# Patient Record
Sex: FEMALE | Race: WHITE | NOT HISPANIC OR LATINO | Employment: FULL TIME | URBAN - METROPOLITAN AREA
[De-identification: names, ages, dates, MRNs, and addresses within clinical notes are randomized per-mention and may not be internally consistent; named-entity substitution may affect disease eponyms.]

---

## 2022-04-10 ENCOUNTER — HISTORICAL (OUTPATIENT)
Dept: ADMINISTRATIVE | Facility: HOSPITAL | Age: 37
End: 2022-04-10

## 2022-04-25 VITALS
BODY MASS INDEX: 21.1 KG/M2 | WEIGHT: 131.31 LBS | DIASTOLIC BLOOD PRESSURE: 81 MMHG | OXYGEN SATURATION: 98 % | SYSTOLIC BLOOD PRESSURE: 115 MMHG | HEIGHT: 66 IN

## 2022-11-21 ENCOUNTER — HOSPITAL ENCOUNTER (EMERGENCY)
Facility: HOSPITAL | Age: 37
Discharge: HOME | End: 2022-11-21
Attending: STUDENT IN AN ORGANIZED HEALTH CARE EDUCATION/TRAINING PROGRAM
Payer: COMMERCIAL

## 2022-11-21 VITALS
SYSTOLIC BLOOD PRESSURE: 139 MMHG | OXYGEN SATURATION: 100 % | HEART RATE: 100 BPM | DIASTOLIC BLOOD PRESSURE: 99 MMHG | TEMPERATURE: 99 F | RESPIRATION RATE: 18 BRPM

## 2022-11-21 DIAGNOSIS — R03.0 ELEVATED BLOOD PRESSURE READING: Primary | ICD-10-CM

## 2022-11-21 DIAGNOSIS — R42 DIZZINESS: ICD-10-CM

## 2022-11-21 LAB
ALBUMIN SERPL-MCNC: 4.4 GM/DL (ref 3.5–5)
ALBUMIN/GLOB SERPL: 1.4 RATIO (ref 1.1–2)
ALP SERPL-CCNC: 58 UNIT/L (ref 40–150)
ALT SERPL-CCNC: 26 UNIT/L (ref 0–55)
APPEARANCE UR: CLEAR
AST SERPL-CCNC: 20 UNIT/L (ref 5–34)
B-HCG SERPL QL: NEGATIVE
BACTERIA #/AREA URNS AUTO: NORMAL /HPF
BASOPHILS # BLD AUTO: 0.04 X10(3)/MCL (ref 0–0.2)
BASOPHILS NFR BLD AUTO: 0.5 %
BILIRUB UR QL STRIP.AUTO: NEGATIVE MG/DL
BILIRUBIN DIRECT+TOT PNL SERPL-MCNC: 0.2 MG/DL
BUN SERPL-MCNC: 9.3 MG/DL (ref 7–18.7)
CALCIUM SERPL-MCNC: 9.6 MG/DL (ref 8.4–10.2)
CHLORIDE SERPL-SCNC: 105 MMOL/L (ref 98–107)
CO2 SERPL-SCNC: 24 MMOL/L (ref 22–29)
COLOR UR AUTO: YELLOW
CREAT SERPL-MCNC: 0.74 MG/DL (ref 0.55–1.02)
EOSINOPHIL # BLD AUTO: 0.1 X10(3)/MCL (ref 0–0.9)
EOSINOPHIL NFR BLD AUTO: 1.2 %
ERYTHROCYTE [DISTWIDTH] IN BLOOD BY AUTOMATED COUNT: 13 % (ref 11.5–17)
GFR SERPLBLD CREATININE-BSD FMLA CKD-EPI: >60 MLS/MIN/1.73/M2
GLOBULIN SER-MCNC: 3.2 GM/DL (ref 2.4–3.5)
GLUCOSE SERPL-MCNC: 114 MG/DL (ref 74–100)
GLUCOSE UR QL STRIP.AUTO: NEGATIVE MG/DL
HCT VFR BLD AUTO: 40 % (ref 37–47)
HGB BLD-MCNC: 13.2 GM/DL (ref 12–16)
IMM GRANULOCYTES # BLD AUTO: 0.02 X10(3)/MCL (ref 0–0.04)
IMM GRANULOCYTES NFR BLD AUTO: 0.2 %
KETONES UR QL STRIP.AUTO: NEGATIVE MG/DL
LEUKOCYTE ESTERASE UR QL STRIP.AUTO: NEGATIVE UNIT/L
LYMPHOCYTES # BLD AUTO: 1.57 X10(3)/MCL (ref 0.6–4.6)
LYMPHOCYTES NFR BLD AUTO: 19 %
MCH RBC QN AUTO: 28.3 PG (ref 27–31)
MCHC RBC AUTO-ENTMCNC: 33 MG/DL (ref 33–36)
MCV RBC AUTO: 85.8 FL (ref 80–94)
MONOCYTES # BLD AUTO: 0.65 X10(3)/MCL (ref 0.1–1.3)
MONOCYTES NFR BLD AUTO: 7.9 %
NEUTROPHILS # BLD AUTO: 5.9 X10(3)/MCL (ref 2.1–9.2)
NEUTROPHILS NFR BLD AUTO: 71.2 %
NITRITE UR QL STRIP.AUTO: NEGATIVE
NRBC BLD AUTO-RTO: 0 %
PH UR STRIP.AUTO: 8 [PH]
PLATELET # BLD AUTO: 268 X10(3)/MCL (ref 130–400)
PMV BLD AUTO: 10.1 FL (ref 7.4–10.4)
POTASSIUM SERPL-SCNC: 4.2 MMOL/L (ref 3.5–5.1)
PROT SERPL-MCNC: 7.6 GM/DL (ref 6.4–8.3)
PROT UR QL STRIP.AUTO: NEGATIVE MG/DL
RBC # BLD AUTO: 4.66 X10(6)/MCL (ref 4.2–5.4)
RBC #/AREA URNS AUTO: <5 /HPF
RBC UR QL AUTO: NEGATIVE UNIT/L
SODIUM SERPL-SCNC: 137 MMOL/L (ref 136–145)
SP GR UR STRIP.AUTO: 1.01 (ref 1–1.03)
SQUAMOUS #/AREA URNS AUTO: <5 /HPF
TROPONIN I SERPL-MCNC: <0.01 NG/ML (ref 0–0.04)
TSH SERPL-ACNC: 1.38 UIU/ML (ref 0.35–4.94)
UROBILINOGEN UR STRIP-ACNC: 0.2 MG/DL
WBC # SPEC AUTO: 8.3 X10(3)/MCL (ref 4.5–11.5)
WBC #/AREA URNS AUTO: <5 /HPF

## 2022-11-21 PROCEDURE — 93010 ELECTROCARDIOGRAM REPORT: CPT | Mod: ,,, | Performed by: STUDENT IN AN ORGANIZED HEALTH CARE EDUCATION/TRAINING PROGRAM

## 2022-11-21 PROCEDURE — 93010 EKG 12-LEAD: ICD-10-PCS | Mod: ,,, | Performed by: STUDENT IN AN ORGANIZED HEALTH CARE EDUCATION/TRAINING PROGRAM

## 2022-11-21 PROCEDURE — 93005 ELECTROCARDIOGRAM TRACING: CPT

## 2022-11-21 PROCEDURE — 84484 ASSAY OF TROPONIN QUANT: CPT | Performed by: PHYSICIAN ASSISTANT

## 2022-11-21 PROCEDURE — 80053 COMPREHEN METABOLIC PANEL: CPT | Performed by: PHYSICIAN ASSISTANT

## 2022-11-21 PROCEDURE — 81001 URINALYSIS AUTO W/SCOPE: CPT | Performed by: PHYSICIAN ASSISTANT

## 2022-11-21 PROCEDURE — 81003 URINALYSIS AUTO W/O SCOPE: CPT | Performed by: PHYSICIAN ASSISTANT

## 2022-11-21 PROCEDURE — 99285 EMERGENCY DEPT VISIT HI MDM: CPT | Mod: 25

## 2022-11-21 PROCEDURE — 81025 URINE PREGNANCY TEST: CPT | Performed by: PHYSICIAN ASSISTANT

## 2022-11-21 PROCEDURE — 84443 ASSAY THYROID STIM HORMONE: CPT | Performed by: PHYSICIAN ASSISTANT

## 2022-11-21 PROCEDURE — 85025 COMPLETE CBC W/AUTO DIFF WBC: CPT | Performed by: PHYSICIAN ASSISTANT

## 2022-11-21 NOTE — FIRST PROVIDER EVALUATION
Medical screening examination initiated.  I have conducted a focused provider triage encounter, findings are as follows:    Chief Complaint   Patient presents with    arm numbness     Pt. C/o bilat arm numbness with elevated bp as high as 140's systolic.. denies medical problems, fever.. reports hx of anxiety reports took an old script of lexapro this morning.. 96     Brief history of present illness:  37 y.o. female presents to the ED with BUE numbness/tingling onset 1330 with elevated BP (143/101). Also dizziness. No history of HTN. Started on anxiety medications (lexapro) this morning. Denies headache or blurred vision. CBG 96    Vitals:    11/21/22 1505   BP: (!) 156/68   Pulse: 110   Resp: 20   Temp: 98.8 °F (37.1 °C)   SpO2: 100%     Pertinent physical exam:  Awake, alert, ambulatory, non-labored respirations    Brief workup plan:  labs, head CT, EKG    Preliminary workup initiated; this workup will be continued and followed by the physician or advanced practice provider that is assigned to the patient when roomed.

## 2022-11-22 NOTE — ED NOTES
Assumed care of pt at this time. Pt resting in stretcher w/equal unlabored respirations.GCS 15, AOX4. No apparent distress. No new complaints at this time. Updated pt on plan of care. Side rails raised, call bell within reach, bed locked and in lowest position. Will continue to monitor.

## 2022-11-22 NOTE — ED PROVIDER NOTES
Encounter Date: 11/21/2022       History     Chief Complaint   Patient presents with    arm numbness     Pt. C/o bilat arm numbness with elevated bp as high as 140's systolic.. denies medical problems, fever.. reports hx of anxiety reports took an old script of lexapro this morning.. 96     Patient is a 37-year-old female  that presents with shortness of breath that has been present today.  Has had same symptoms intermittent for 3 weeks. Associated symptoms bilateral hand numbness and bilateral lip numbness. Surrounding information is mildly elevated blood pressure. Exacerbated by nothing. Relieved by nothing. Patient treatment prior to arrival none. Risk factors include none. Other history pertaining to this complaint nothing.       The history is provided by the patient. No  was used.   Review of patient's allergies indicates:  No Known Allergies  Past Medical History:   Diagnosis Date    Known health problems: none      History reviewed. No pertinent surgical history.  History reviewed. No pertinent family history.  Social History     Tobacco Use    Smoking status: Never    Smokeless tobacco: Never   Substance Use Topics    Alcohol use: Not Currently    Drug use: Not Currently     Review of Systems   Constitutional:  Negative for fever.   Respiratory:  Positive for shortness of breath. Negative for cough.    Cardiovascular:  Negative for chest pain.   Gastrointestinal:  Negative for abdominal pain.   Genitourinary:  Negative for difficulty urinating and dysuria.   Musculoskeletal:  Negative for gait problem.   Skin:  Negative for color change.   Neurological:  Positive for numbness. Negative for dizziness, speech difficulty and headaches.   Psychiatric/Behavioral:  Negative for hallucinations and suicidal ideas.    All other systems reviewed and are negative.    Physical Exam     Initial Vitals [11/21/22 1505]   BP Pulse Resp Temp SpO2   (!) 156/68 110 20 98.8 °F (37.1 °C) 100 %      MAP        --         Physical Exam    Nursing note and vitals reviewed.  Constitutional: She appears well-developed and well-nourished.   HENT:   Head: Normocephalic.   Eyes: EOM are normal.   Neck:   Normal range of motion.  Cardiovascular:  Normal rate, regular rhythm, normal heart sounds and intact distal pulses.           Pulmonary/Chest: Breath sounds normal. No respiratory distress.   Abdominal: Abdomen is soft. Bowel sounds are normal. There is no abdominal tenderness.   Musculoskeletal:         General: Normal range of motion.      Cervical back: Normal range of motion.     Neurological: She is alert and oriented to person, place, and time. She has normal strength. No cranial nerve deficit.   Skin: Skin is warm and dry.   Psychiatric: She has a normal mood and affect. Her behavior is normal. Judgment and thought content normal.       ED Course   Procedures  Labs Reviewed   COMPREHENSIVE METABOLIC PANEL - Abnormal; Notable for the following components:       Result Value    Glucose Level 114 (*)     All other components within normal limits   TSH - Normal   URINALYSIS, REFLEX TO URINE CULTURE - Normal   PREGNANCY TEST, URINE RAPID - Normal   TROPONIN I - Normal   URINALYSIS, MICROSCOPIC - Normal   CBC W/ AUTO DIFFERENTIAL    Narrative:     The following orders were created for panel order CBC auto differential.  Procedure                               Abnormality         Status                     ---------                               -----------         ------                     CBC with Differential[[id number]]                            Final result                 Please view results for these tests on the individual orders.   CBC WITH DIFFERENTIAL     EKG Readings: (Independently Interpreted)   Rhythm: Normal Sinus Rhythm. Heart Rate: 101. Ectopy: No Ectopy. Conduction: Normal. ST Segments: Normal ST Segments. T Waves: Normal. Axis: Normal. Clinical Impression: Normal Sinus Rhythm     Imaging Results               CT Head Without Contrast (Final result)  Result time 11/21/22 16:31:56      Final result by Bethany Lozada MD (11/21/22 16:31:56)                   Impression:      No acute abnormality seen      Electronically signed by: Bethany Lozada  Date:    11/21/2022  Time:    16:31               Narrative:    EXAMINATION:  CT HEAD WITHOUT CONTRAST    CLINICAL HISTORY:  Dizziness, persistent/recurrent, cardiac or vascular cause suspected;    TECHNIQUE:  Multiple axial images were obtained from the base of the brain to the vertex without contrast administration.  Sagittal and coronal reconstructions were performed. .Automatic exposure control  (AEC) is utilized to reduce patient radiation exposure.    COMPARISON:  None    FINDINGS:  There is no intracranial mass or lesion seen.  No hemorrhage is seen.  No infarct is seen.  The ventricles and basilar cisterns appear normal.  Brain parenchyma appears grossly unremarkable.    Posterior fossa appears normal.  The calvarium is intact.  The paranasal sinuses appear grossly unremarkable.                                       Medications - No data to display  Medical Decision Making:   Initial Assessment:   See H&P  Differential Diagnosis:   CVA, neuropathy, anxiety, thyroid disorder, MI  ED Management:  History was obtained.  Physical exam was performed.  Patient had no neurological deficits.  Cranial nerves were normal.  Patient had bilateral symptoms that resolved.  Do not feel like this is a central neurological problem.  We will patient will follow up with the PCP and keep a diary of her blood pressure.                        Clinical Impression:   Final diagnoses:  [R42] Dizziness  [R03.0] Elevated blood pressure reading (Primary)        ED Disposition Condition    Discharge Stable          ED Prescriptions    None       Follow-up Information       Follow up With Specialties Details Why Contact Info    Your Primary Care Provider  Call in 3 days ed follow up               Jimbo Lee, [facility]  11/21/22 1625

## 2022-11-22 NOTE — ED NOTES
Discharge instructions, return precautions, and follow up information provided to pt. Pt verbalizes understanding. All questions answered. Pt is GCS 15, equal unlabored respirations. Pt ambulated to exit with steady gait.

## 2022-11-30 ENCOUNTER — OFFICE VISIT (OUTPATIENT)
Dept: FAMILY MEDICINE | Facility: CLINIC | Age: 37
End: 2022-11-30
Payer: COMMERCIAL

## 2022-11-30 VITALS
OXYGEN SATURATION: 98 % | HEART RATE: 90 BPM | BODY MASS INDEX: 22.85 KG/M2 | TEMPERATURE: 99 F | RESPIRATION RATE: 18 BRPM | DIASTOLIC BLOOD PRESSURE: 82 MMHG | WEIGHT: 142.19 LBS | SYSTOLIC BLOOD PRESSURE: 115 MMHG | HEIGHT: 66 IN

## 2022-11-30 DIAGNOSIS — F41.9 ANXIETY: Primary | ICD-10-CM

## 2022-11-30 DIAGNOSIS — Z83.2 FAMILY HISTORY OF AUTOIMMUNE DISORDER: ICD-10-CM

## 2022-11-30 DIAGNOSIS — Z00.00 ANNUAL PHYSICAL EXAM: ICD-10-CM

## 2022-11-30 DIAGNOSIS — R73.9 HYPERGLYCEMIA: ICD-10-CM

## 2022-11-30 PROCEDURE — 99214 PR OFFICE/OUTPT VISIT, EST, LEVL IV, 30-39 MIN: ICD-10-PCS | Mod: ,,, | Performed by: STUDENT IN AN ORGANIZED HEALTH CARE EDUCATION/TRAINING PROGRAM

## 2022-11-30 PROCEDURE — 99214 OFFICE O/P EST MOD 30 MIN: CPT | Mod: ,,, | Performed by: STUDENT IN AN ORGANIZED HEALTH CARE EDUCATION/TRAINING PROGRAM

## 2022-11-30 RX ORDER — ESCITALOPRAM OXALATE 5 MG/1
5 TABLET ORAL DAILY
Qty: 90 TABLET | Refills: 3 | Status: SHIPPED | OUTPATIENT
Start: 2022-11-30 | End: 2023-12-11

## 2022-11-30 RX ORDER — ESCITALOPRAM OXALATE 5 MG/1
5 TABLET ORAL DAILY
COMMUNITY
End: 2022-11-30 | Stop reason: SDUPTHER

## 2022-12-03 LAB — POCT GLUCOSE: 96 MG/DL (ref 70–110)

## 2022-12-21 LAB
CHOLEST SERPL-MSCNC: 181 MG/DL (ref 0–200)
HDLC SERPL-MCNC: 65 MG/DL (ref 35–70)
LDLC SERPL CALC-MCNC: 101 MG/DL (ref 0–160)
TRIGL SERPL-MCNC: 66 MG/DL (ref 40–160)

## 2023-01-10 ENCOUNTER — TELEPHONE (OUTPATIENT)
Dept: FAMILY MEDICINE | Facility: CLINIC | Age: 38
End: 2023-01-10
Payer: COMMERCIAL

## 2023-01-13 ENCOUNTER — PATIENT MESSAGE (OUTPATIENT)
Dept: FAMILY MEDICINE | Facility: CLINIC | Age: 38
End: 2023-01-13
Payer: COMMERCIAL

## 2023-01-13 ENCOUNTER — DOCUMENTATION ONLY (OUTPATIENT)
Dept: FAMILY MEDICINE | Facility: CLINIC | Age: 38
End: 2023-01-13
Payer: COMMERCIAL

## 2023-01-13 NOTE — TELEPHONE ENCOUNTER
Advised pt that our office has not gotten her results. She had the labs done at [facility]. I called [facility]. The results were faxed and scanned in her chart (lipids, JHONATAN). Can you please review them so that I call pt.

## 2023-01-13 NOTE — TELEPHONE ENCOUNTER
Lab results were obtained from [facility] and reviewed by Dr Ziegler. Called pt with the results

## 2023-01-31 ENCOUNTER — OFFICE VISIT (OUTPATIENT)
Dept: FAMILY MEDICINE | Facility: CLINIC | Age: 38
End: 2023-01-31
Payer: COMMERCIAL

## 2023-01-31 VITALS
DIASTOLIC BLOOD PRESSURE: 80 MMHG | RESPIRATION RATE: 18 BRPM | WEIGHT: 143.81 LBS | OXYGEN SATURATION: 98 % | TEMPERATURE: 98 F | HEIGHT: 66 IN | HEART RATE: 98 BPM | SYSTOLIC BLOOD PRESSURE: 113 MMHG | BODY MASS INDEX: 23.11 KG/M2

## 2023-01-31 DIAGNOSIS — Z00.00 ANNUAL PHYSICAL EXAM: Primary | ICD-10-CM

## 2023-01-31 DIAGNOSIS — F41.9 ANXIETY: ICD-10-CM

## 2023-01-31 PROCEDURE — 99395 PR PREVENTIVE VISIT,EST,18-39: ICD-10-PCS | Mod: ,,, | Performed by: STUDENT IN AN ORGANIZED HEALTH CARE EDUCATION/TRAINING PROGRAM

## 2023-01-31 PROCEDURE — 99395 PREV VISIT EST AGE 18-39: CPT | Mod: ,,, | Performed by: STUDENT IN AN ORGANIZED HEALTH CARE EDUCATION/TRAINING PROGRAM

## 2023-01-31 NOTE — PROGRESS NOTES
"Subjective:      Patient ID: Nevin Liz is a 38 y.o.  female.    Chief Complaint: Wellness    Preventative Health: Patient would like to defer Tdap to a future visit. Patient UTD on labs. Patient following with Dr. Fer Luu with OB-GYN for her well-woman exam.    Anxiety: Mood stable on Lexapro.    Review of Systems   Constitutional:  Negative for activity change, fatigue and fever.   Eyes:  Negative for visual disturbance.   Respiratory:  Negative for apnea, cough and shortness of breath.    Cardiovascular:  Negative for chest pain, palpitations and leg swelling.   Gastrointestinal:  Negative for abdominal pain, diarrhea, nausea and vomiting.   Genitourinary:  Negative for dysuria and hematuria.   Musculoskeletal:  Negative for arthralgias, back pain and myalgias.   Skin:  Negative for rash and wound.   Neurological:  Negative for dizziness, syncope, weakness, light-headedness, numbness and headaches.   Psychiatric/Behavioral:  Negative for behavioral problems, dysphoric mood, hallucinations, self-injury, sleep disturbance and suicidal ideas. The patient is not nervous/anxious.      Objective:   /80 (BP Location: Right arm, Patient Position: Sitting, BP Method: Large (Automatic))   Pulse 98   Temp 98.4 °F (36.9 °C) (Oral)   Resp 18   Ht 5' 6" (1.676 m)   Wt 65.2 kg (143 lb 12.8 oz)   LMP 01/30/2023   SpO2 98%   BMI 23.21 kg/m²     Physical Exam  Vitals and nursing note reviewed.   Constitutional:       General: She is not in acute distress.     Appearance: Normal appearance. She is not ill-appearing or toxic-appearing.   HENT:      Head: Normocephalic and atraumatic.      Mouth/Throat:      Mouth: Mucous membranes are moist.      Pharynx: Oropharynx is clear. No oropharyngeal exudate or posterior oropharyngeal erythema.   Eyes:      Conjunctiva/sclera: Conjunctivae normal.   Cardiovascular:      Rate and Rhythm: Normal rate and regular rhythm.      Heart sounds: Normal heart sounds. No " murmur heard.  Pulmonary:      Effort: Pulmonary effort is normal. No respiratory distress.      Breath sounds: Normal breath sounds.   Abdominal:      General: There is no distension.      Palpations: Abdomen is soft.      Tenderness: There is no abdominal tenderness.   Musculoskeletal:         General: No deformity.      Cervical back: Neck supple. No tenderness.      Right lower leg: No edema.      Left lower leg: No edema.   Lymphadenopathy:      Cervical: No cervical adenopathy.   Skin:     General: Skin is warm and dry.      Findings: No lesion or rash.   Neurological:      General: No focal deficit present.      Mental Status: She is alert. Mental status is at baseline.   Psychiatric:         Mood and Affect: Mood normal.         Behavior: Behavior normal.         Thought Content: Thought content normal.         Judgment: Judgment normal.     Assessment/Plan:   1. Annual physical exam  Comments:  - Health maintenance reviewed with patient.  - Patient UTD on labs.    2. Anxiety  Assessment & Plan:  - Mood stable.  - Continue Lexapro 5mg daily.         Follow up in about 1 year (around 1/31/2024) for Wellness.

## 2023-02-03 ENCOUNTER — PATIENT MESSAGE (OUTPATIENT)
Dept: FAMILY MEDICINE | Facility: CLINIC | Age: 38
End: 2023-02-03
Payer: COMMERCIAL

## 2023-02-06 ENCOUNTER — PATIENT MESSAGE (OUTPATIENT)
Dept: FAMILY MEDICINE | Facility: CLINIC | Age: 38
End: 2023-02-06
Payer: COMMERCIAL

## 2023-02-06 RX ORDER — SCOLOPAMINE TRANSDERMAL SYSTEM 1 MG/1
1 PATCH, EXTENDED RELEASE TRANSDERMAL
Qty: 4 PATCH | Refills: 0 | Status: SHIPPED | OUTPATIENT
Start: 2023-02-06 | End: 2023-09-15

## 2023-03-17 ENCOUNTER — PATIENT MESSAGE (OUTPATIENT)
Dept: RESEARCH | Facility: HOSPITAL | Age: 38
End: 2023-03-17
Payer: COMMERCIAL

## 2023-06-05 ENCOUNTER — PATIENT MESSAGE (OUTPATIENT)
Dept: FAMILY MEDICINE | Facility: CLINIC | Age: 38
End: 2023-06-05
Payer: COMMERCIAL

## 2023-06-05 NOTE — TELEPHONE ENCOUNTER
In regards to pregnancy with Lexapro:     - Lexapro crosses the placenta and is distributed into the amniotic fluid.  - If pregnancy occurs during treatment, Lexapro can be continued.   - You have to weigh risk/benefit during pregnancy, especially in the 3rd trimester. Adverse effects in the  following SSRI exposure late in the 3rd trimester can include: apnea, constant crying, cyanosis, feeding difficulty, hyperreflexia, hypo- or hypertonia, hypoglycemia, irritability, jitteriness, respiratory distress, seizures, temperature instability, tremor, and vomiting.  - Risk of fetal harm low, though risk of  withdrawal symptoms or serotonin syndrome based on human data.  - Risk of  persistent pulmonary hypertension or autism inconclusive.    In summation, there are always potential risks when taking any medications. I want the patient to be fully educated on the potential risks if she chooses to continue her Lexapro. She can also consult with her OB-GYN for other recommendations for her anxiety as well.

## 2023-06-08 ENCOUNTER — TELEPHONE (OUTPATIENT)
Dept: FAMILY MEDICINE | Facility: CLINIC | Age: 38
End: 2023-06-08
Payer: COMMERCIAL

## 2023-06-08 NOTE — TELEPHONE ENCOUNTER
----- Message from Roxanne Bailon sent at 6/7/2023  4:36 PM CDT -----  Regarding: med adv  Type:  Needs Medical Advice    Who Called: Dr. Erica Worrell's office  Additional Information: please refax patient's information, there were missing information. Please fax to [phone]

## 2023-07-26 LAB
ABO AND RH: NORMAL
ABO AND RH: NORMAL
ANTIBODY SCREEN: NORMAL
C TRACH RRNA SPEC QL PROBE: NORMAL
HBV SURFACE AG SERPL QL IA: NEGATIVE
HCV AB SERPL QL IA: NORMAL
HIV 1+2 AB+HIV1 P24 AG SERPL QL IA: NORMAL
N GONORRHOEAE, AMPLIFIED DNA: NORMAL
RPR: NONREACTIVE
RUBV IGG SER QL: NORMAL

## 2023-08-28 DIAGNOSIS — O36.0920 ANTI-E ISOIMMUNIZATION AFFECTING PREGNANCY IN SECOND TRIMESTER, SINGLE OR UNSPECIFIED FETUS: ICD-10-CM

## 2023-08-28 DIAGNOSIS — O09.522 AMA (ADVANCED MATERNAL AGE) MULTIGRAVIDA 35+, SECOND TRIMESTER: Primary | ICD-10-CM

## 2023-09-14 ENCOUNTER — OFFICE VISIT (OUTPATIENT)
Dept: MATERNAL FETAL MEDICINE | Facility: CLINIC | Age: 38
End: 2023-09-14
Payer: COMMERCIAL

## 2023-09-14 ENCOUNTER — PROCEDURE VISIT (OUTPATIENT)
Dept: MATERNAL FETAL MEDICINE | Facility: CLINIC | Age: 38
End: 2023-09-14
Payer: COMMERCIAL

## 2023-09-14 VITALS
RESPIRATION RATE: 18 BRPM | DIASTOLIC BLOOD PRESSURE: 83 MMHG | SYSTOLIC BLOOD PRESSURE: 139 MMHG | BODY MASS INDEX: 25.86 KG/M2 | HEART RATE: 113 BPM | HEIGHT: 66 IN | WEIGHT: 160.94 LBS

## 2023-09-14 DIAGNOSIS — O99.342 MENTAL DISORDER AFFECTING PREGNANCY IN SECOND TRIMESTER: ICD-10-CM

## 2023-09-14 DIAGNOSIS — O34.42 HISTORY OF CERVICAL LEEP BIOPSY AFFECTING CARE OF MOTHER, ANTEPARTUM, SECOND TRIMESTER: ICD-10-CM

## 2023-09-14 DIAGNOSIS — O34.12 UTERINE FIBROIDS AFFECTING PREGNANCY IN SECOND TRIMESTER: ICD-10-CM

## 2023-09-14 DIAGNOSIS — O09.522 ELDERLY MULTIGRAVIDA IN SECOND TRIMESTER: ICD-10-CM

## 2023-09-14 DIAGNOSIS — O09.522 AMA (ADVANCED MATERNAL AGE) MULTIGRAVIDA 35+, SECOND TRIMESTER: ICD-10-CM

## 2023-09-14 DIAGNOSIS — O34.592 ANOMALY OF PREGNANT UTERUS, SECOND TRIMESTER: ICD-10-CM

## 2023-09-14 DIAGNOSIS — O36.0920 ANTI-E ISOIMMUNIZATION AFFECTING PREGNANCY IN SECOND TRIMESTER, SINGLE OR UNSPECIFIED FETUS: ICD-10-CM

## 2023-09-14 DIAGNOSIS — D25.9 UTERINE FIBROIDS AFFECTING PREGNANCY IN SECOND TRIMESTER: ICD-10-CM

## 2023-09-14 PROCEDURE — 76811 OB US DETAILED SNGL FETUS: CPT | Mod: S$GLB,,, | Performed by: OBSTETRICS & GYNECOLOGY

## 2023-09-14 PROCEDURE — 76811 PR US, OB FETAL EVAL & EXAM, TRANSABDOM,FIRST GESTATION: ICD-10-PCS | Mod: S$GLB,,, | Performed by: OBSTETRICS & GYNECOLOGY

## 2023-09-14 PROCEDURE — 99204 OFFICE O/P NEW MOD 45 MIN: CPT | Mod: 25,S$GLB,, | Performed by: OBSTETRICS & GYNECOLOGY

## 2023-09-14 PROCEDURE — 99204 PR OFFICE/OUTPT VISIT, NEW, LEVL IV, 45-59 MIN: ICD-10-PCS | Mod: 25,S$GLB,, | Performed by: OBSTETRICS & GYNECOLOGY

## 2023-09-14 NOTE — ASSESSMENT & PLAN NOTE
There are > 50 different red cell antigens that have been associated with hemolytic disease of the fetus and  (HDFN), but the antibodies frequently associated with severe HDFN include those against RhD, Rhc, and [place] (K1). 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   We discussed the pathophysiology of antibody sensitization as well as the risk of fetal anemia and hydrops. HDFN does not usually occur until a critical titer is reached (1:16 at [facility]).     23- too weak to titer    Recommendations:   Verification of the father of the babys blood group and antigen phenotype. This will reveal one of the following scenarios:  o If he is does not carry the E antigen (phenotype negative), there is nothing further to do as this fetus will not have inherited the E antigen and will not be at risk for hemolytic disease.      o If he carries the antigen (phenotype positive), may opt to determine genotype/zygosity:  - If he is heterozygous, the fetus has a 50% chance of carrying the antigen. Amniocentesis may offered to verify fetal blood cell antigen genotyping, which is associated with a small risk of miscarriage.    - If he is homozygous for E, then the fetus carries the antigen and management is discussed below.       If the FOB is unknown, declines testing, FOB is homozygous for the E antigen, or if the patient declines amniocentesis, recommend repeat titer at 4-week intervals, using the same lab as the initial titers. If the patients antibody titer rises to a critical value of 1:16, there are two main options:    Without a critical titer, delivery at 39 weeks may be undertaken with no PNT indicated.   Consider maternal blood cross-matching at admission for delivery (if rare antibody and increased risk of hemorrhage) due to potential delay in finding matched units.     Please note that this management scheme assumes that the patients partner is indeed the father of the baby.

## 2023-09-14 NOTE — ASSESSMENT & PLAN NOTE
Today I counseled the patient on the relationship between maternal age and fetal aneuploidy.  We discussed the risks and benefits of screening tests versus definitive genetic testing (amniocentesis). She was counseled about her specific age-related risk of fetal aneuploidy. We discussed the limitations of ultrasound in the definitive diagnosis of fetal aneuploidy.  I quoted the patient a 1 in 900 procedure related risk of fetal loss with genetic amniocentesis.  We also discussed cell free fetal DNA screening including the sensitivity and specificity of the test.  Her questions were answered and after today's consultation she did not want to pursue amniocentesis.  She completed NIPT- 46,XY  Additionally, we discussed the association between advanced maternal age (AMA) and preeclampsia, gestational diabetes, and fetal growth restriction.    Recommendations:   Detailed anatomic survey at 19-20 weeks (started today, some views limited)     Follow-up fetal ultrasound at 32-34 weeks for interval fetal growth   Consider low dose aspirin at 12-16 weeks for preeclampsia risk reduction

## 2023-09-14 NOTE — ASSESSMENT & PLAN NOTE
I counseled the patient regarding Mullerian anomalies in pregnancy. Patient was counseled regarding the risk of increased miscarriage rates, second-trimester loss, low birth weight/FGR, malpresentation, and bleeding. Evidence based guidelines for management and prevention of the above are limited.     Septate uterus    Recommendations:   In women without a history of  birth:  o Transvaginal cervical length measurement at the time of the anatomic survey (previously completed at Dr Walsh's office and normal; abdominal assessment of cervical length normal)   Fetal growth ultrasound at 32 weeks gestation

## 2023-09-14 NOTE — ASSESSMENT & PLAN NOTE
LEEP of the cervix has been associated with premature labor and  PROM. Most such deliveries are late  births (34-37 weeks), and many studies show no significant difference in the frequency of infants with birth weight <2500g.  Adverse outcomes may be associated with the  time interval from procedure to conception.    Cervical length measurement will be performed at the time of anatomy scan. Prophylactic cerclage placement solely for history of LEEP, has not been shown to provide benefit, and it is not recommended.    TVU CL completed at primary OB's office. Abdominal assessment of cervix normal today. TVU was deferred as she already has had TVU at other office and we are able to see it well abdominally.  Notably, she had a full term delivery after LEEP.     Recommendations:   Baseline transvaginal ultrasound cervical length (TVU CL) at anatomy scan (19-20 weeks' gestation)  o If TVU CL ?30 mm, then routine prenatal care.  o If TVU CL 25-29 mm, then:  - Repeat TVU CL in 7-14 days, if unchanged then routine prenatal care. If <25 mm, then see bullets directly below.  o If TVU CL <25 mm, then:  - Consideration for micronized progesterone 200mg nightly as a vaginal suppository until 37w0d.  - Repeat TVU CL every 1-2 weeks until 22w6d for possible further shortening

## 2023-09-14 NOTE — PROGRESS NOTES
MATERNAL-FETAL MEDICINE   CONSULT NOTE    Provider requesting consultation: Dr Walsh    SUBJECTIVE:     Ms. Nevin Liz is a 38 y.o.  female with IUP at 19w3d who is seen in consultation by MFM for evaluation and management of:  Problem   1. 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 AMA in second trimester   3. Anomaly of pregnant uterus, second trimester   4. History of cervical LEEP biopsy affecting care of mother, antepartum, second trimester   5. Uterine fibroids affecting pregnancy in second trimester   6. Anxiety affecting pregnancy in second trimester     Nevin presents for consultation due to anti-E.  On , it was too weak to titer.  She denies a history of blood transfusions.  She is currently 38 years old and will be 39 at the time of delivery.  She completed a NIPT with low risk results.  She states she was diagnosed with a septate uterus in her previous pregnancy.  With her current pregnancy, she was told the pregnancy is in the right side of her uterus due to the location of the septum.  She had a LEEP in .  She completed a transvaginal cervical length screening with her primary OB which was normal per her report.  She had a subsequent full-term delivery with a normal birth weight after this procedure.  She has a history of multiple uterine fibroids.  She denies any pain or discomfort associated with these fibroids.  However, she states she is aware of their location because she is able to palpate the fibroids on her abdomen.  She has a history of anxiety and is taking Lexapro 5 mg daily.  She states this medication works well to keep her symptoms well controlled.       Medication List with Changes/Refills   Current Medications    ESCITALOPRAM OXALATE (LEXAPRO) 5 MG TAB    Take 1 tablet (5 mg total) by mouth once daily.    SCOPOLAMINE (TRANSDERM-SCOP) 1.3-1.5 MG (1 MG OVER 3 DAYS)    Place 1 patch onto the skin every 72 hours.       Review of patient's  "allergies indicates:   Allergen Reactions    Gluten Hives    Penicillins      Rash on chest        PMH:  Past Medical History:   Diagnosis Date    Anxiety     Uterine fibroid        PObHx:  OB History    Para Term  AB Living   2 1 1     1   SAB IAB Ectopic Multiple Live Births           1      # Outcome Date GA Lbr Monico/2nd Weight Sex Delivery Anes PTL Lv   2 Current            1 Term 14 39w0d  3.033 kg (6 lb 11 oz) F CS-LTranv   MARY JO      Birth Comments: breech       PSH:  Past Surgical History:   Procedure Laterality Date     SECTION      CYSTOSCOPY      HERNIA REPAIR      LOOP ELECTROSURGICAL EXCISION PROCEDURE (LEEP)      WISDOM TOOTH EXTRACTION         Family history:family history is not on file.    Social history: reports that she has never smoked. She has never used smokeless tobacco. She reports that she does not currently use alcohol. She reports that she does not use drugs.    Genetic history: The patient denies any inherited genetic diseases or birth defects in herself or her partner's personal history or family.    Objective:   /83 (BP Location: Left arm, Patient Position: Sitting, BP Method: Medium (Automatic))   Pulse (!) 113   Resp 18   Ht 5' 6" (1.676 m)   Wt 73 kg (160 lb 15 oz)   LMP 2023   BMI 25.98 kg/m²     Ultrasound performed. See viewpoint for full ultrasound report.    A detailed fetal anatomic ultrasound examination was performed for the following high risk indication: AMA.   No fetal structural malformations are identified; however, fetal imaging is incomplete today for 3VTV, spine, lateral ventricles and aortic arch.  A follow-up study will be scheduled to complete the fetal anatomic survey.   Fetal size today is consistent with established gestational age.   Cervical length by TA scanning is normal.   Placental location is anterior without evidence of previa. The placental cord insertion is marginal.   Multiple fibroids are present in the " lower uterine segment and anterior body/fundus of the uterus as measured above.     ASSESSMENT/PLAN:     38 y.o.  female with IUP at 19w3d     1. 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   We discussed the pathophysiology of antibody sensitization as well as the risk of fetal anemia and hydrops. HDFN does not usually occur until a critical titer is reached (1:16 at [facility]).     23- too weak to titer    Recommendations:  Verification of the father of the babys blood group and antigen phenotype. This will reveal one of the following scenarios:  If he is does not carry the E antigen (phenotype negative), there is nothing further to do as this fetus will not have inherited the E antigen and will not be at risk for hemolytic disease.      If he carries the antigen (phenotype positive), may opt to determine genotype/zygosity:  If he is heterozygous, the fetus has a 50% chance of carrying the antigen. Amniocentesis may offered to verify fetal blood cell antigen genotyping, which is associated with a small risk of miscarriage.    If he is homozygous for E, then the fetus carries the antigen and management is discussed below.      If the FOB is unknown, declines testing, FOB is homozygous for the E antigen, or if the patient declines amniocentesis, recommend repeat titer at 4-week intervals, using the same lab as the initial titers. If the patients antibody titer rises to a critical value of 1:16, there are two main options:   Without a critical titer, delivery at 39 weeks may be undertaken with no PNT indicated.  Consider maternal blood cross-matching at admission for delivery (if rare antibody and increased risk of  hemorrhage) due to potential delay in finding matched units.     Please note that this management scheme assumes that the patients partner is indeed the father of the baby.          2. AMA in second trimester  Today I counseled the patient on the relationship between maternal age and fetal aneuploidy.  We discussed the risks and benefits of screening tests versus definitive genetic testing (amniocentesis). She was counseled about her specific age-related risk of fetal aneuploidy. We discussed the limitations of ultrasound in the definitive diagnosis of fetal aneuploidy.  I quoted the patient a 1 in 900 procedure related risk of fetal loss with genetic amniocentesis.  We also discussed cell free fetal DNA screening including the sensitivity and specificity of the test.  Her questions were answered and after today's consultation she did not want to pursue amniocentesis.  She completed NIPT- 46,XY  Additionally, we discussed the association between advanced maternal age (AMA) and preeclampsia, gestational diabetes, and fetal growth restriction.    Recommendations:  Detailed anatomic survey at 19-20 weeks (started today, some views limited)    Follow-up fetal ultrasound at 32-34 weeks for interval fetal growth  Consider low dose aspirin at 12-16 weeks for preeclampsia risk reduction        4. History of cervical LEEP biopsy affecting care of mother, antepartum, second trimester  LEEP of the cervix has been associated with premature labor and  PROM. Most such deliveries are late  births (34-37 weeks), and many studies show no significant difference in the frequency of infants with birth weight <2500g.  Adverse outcomes may be associated with the  time interval from procedure to conception.    Cervical length measurement will be performed at the time of anatomy scan. Prophylactic cerclage placement solely for history of LEEP, has not been shown to provide benefit, and it is not recommended.    TVU CL  completed at primary OB's office. Abdominal assessment of cervix normal today. TVU was deferred as she already has had TVU at other office and we are able to see it well abdominally.  Notably, she had a full term delivery after LEEP.     Recommendations:  Baseline transvaginal ultrasound cervical length (TVU CL) at anatomy scan (19-20 weeks' gestation)  If TVU CL ?30 mm, then routine prenatal care.  If TVU CL 25-29 mm, then:  Repeat TVU CL in 7-14 days, if unchanged then routine prenatal care. If <25 mm, then see bullets directly below.  If TVU CL <25 mm, then:  Consideration for micronized progesterone 200mg nightly as a vaginal suppository until 37w0d.  Repeat TVU CL every 1-2 weeks until 22w6d for possible further shortening        3. Anomaly of pregnant uterus, second trimester  I counseled the patient regarding Mullerian anomalies in pregnancy. Patient was counseled regarding the risk of increased miscarriage rates, second-trimester loss, low birth weight/FGR, malpresentation, and bleeding. Evidence based guidelines for management and prevention of the above are limited.     Septate uterus    Recommendations:  In women without a history of  birth:  Transvaginal cervical length measurement at the time of the anatomic survey (previously completed at Dr Walsh's office and normal; abdominal assessment of cervical length normal)  Fetal growth ultrasound at 32 weeks gestation      5. Uterine fibroids affecting pregnancy in second trimester  I counseled the patient regarding fibroids in pregnancy. She was counseled on the risk for  delivery,  PROM, fibroid degeneration, poor fetal growth, oligohydramnios, vaginal bleeding, and in rare circumstances obstruction of lower uterus/cervix, impeding vaginal delivery.    23- HERNAN intramural fibroid 4.88 x 3.8 x 4.26cm; multiple anterior intramural fibroids with largest measuring 6.01 x 4.37 x 2.36cm    Recommendations:  Fetal growth ultrasound at  32 weeks for patients with a single large fibroid or multiple fibroids  More frequent ultrasounds can be performed per clinical concern  If concern for degenerating fibroids, can administer a short course of NSAIDs (approximately 48-72 hours of Indocin or ibuprofen) prior to 32 weeks gestation  Document postpartum hemorrhage risk on admission to hospital, ensure T&S sent, and consider type and crossmatch depending on postpartum hemorrhage risk      6. Anxiety affecting pregnancy in second trimester  She reports a history of anxiety and is taking Lexapro 5mg daily. She reports improvement of symptoms with the utilization of this medication regimen. Discussed increased risk for peripartum and postpartum mood disorders. Precautions provided.      We discussed the usage of SSRIs in pregnancy and the minimal risks associated with the fetus.      We have discussed the importance of optimization of mental health conditions during pregnancy in an effort to reduce the risk of postpartum mood disorders. We have discussed options for management including psychotherapy, counseling, cognitive behavioral therapy, exercise/yoga, journaling, and psychiatry services. She will notify our office if she is interested in being referred for any of the above.        FOLLOW UP:   F/u in 4 weeks for US/MFM visit    This consultation was completed with the assistance of Jerica Ly NP.      Leonarda Qureshi MD  Maternal Fetal Medicine

## 2023-09-14 NOTE — ASSESSMENT & PLAN NOTE
I counseled the patient regarding fibroids in pregnancy. She was counseled on the risk for  delivery,  PROM, fibroid degeneration, poor fetal growth, oligohydramnios, vaginal bleeding, and in rare circumstances obstruction of lower uterus/cervix, impeding vaginal delivery.    23- HERNAN intramural fibroid 4.88 x 3.8 x 4.26cm; multiple anterior intramural fibroids with largest measuring 6.01 x 4.37 x 2.36cm    Recommendations:   Fetal growth ultrasound at 32 weeks for patients with a single large fibroid or multiple fibroids  o More frequent ultrasounds can be performed per clinical concern   If concern for degenerating fibroids, can administer a short course of NSAIDs (approximately 48-72 hours of Indocin or ibuprofen) prior to 32 weeks gestation   Document postpartum hemorrhage risk on admission to hospital, ensure T&S sent, and consider type and crossmatch depending on postpartum hemorrhage risk

## 2023-09-14 NOTE — ASSESSMENT & PLAN NOTE
She reports a history of anxiety and is taking Lexapro 5mg daily. She reports improvement of symptoms with the utilization of this medication regimen. Discussed increased risk for peripartum and postpartum mood disorders. Precautions provided.      We discussed the usage of SSRIs in pregnancy and the minimal risks associated with the fetus.      We have discussed the importance of optimization of mental health conditions during pregnancy in an effort to reduce the risk of postpartum mood disorders. We have discussed options for management including psychotherapy, counseling, cognitive behavioral therapy, exercise/yoga, journaling, and psychiatry services. She will notify our office if she is interested in being referred for any of the above.

## 2023-10-11 DIAGNOSIS — O36.0920 ANTI-E ISOIMMUNIZATION AFFECTING PREGNANCY IN SECOND TRIMESTER, SINGLE OR UNSPECIFIED FETUS: Primary | ICD-10-CM

## 2023-10-11 DIAGNOSIS — O34.592 ANOMALY OF PREGNANT UTERUS, SECOND TRIMESTER: ICD-10-CM

## 2023-10-11 DIAGNOSIS — O09.522 ELDERLY MULTIGRAVIDA IN SECOND TRIMESTER: ICD-10-CM

## 2023-10-12 ENCOUNTER — PROCEDURE VISIT (OUTPATIENT)
Dept: MATERNAL FETAL MEDICINE | Facility: CLINIC | Age: 38
End: 2023-10-12
Payer: COMMERCIAL

## 2023-10-12 ENCOUNTER — OFFICE VISIT (OUTPATIENT)
Dept: MATERNAL FETAL MEDICINE | Facility: CLINIC | Age: 38
End: 2023-10-12
Payer: COMMERCIAL

## 2023-10-12 VITALS
DIASTOLIC BLOOD PRESSURE: 73 MMHG | SYSTOLIC BLOOD PRESSURE: 128 MMHG | WEIGHT: 169.88 LBS | BODY MASS INDEX: 27.42 KG/M2 | HEART RATE: 121 BPM

## 2023-10-12 DIAGNOSIS — D25.9 UTERINE FIBROIDS AFFECTING PREGNANCY IN SECOND TRIMESTER: ICD-10-CM

## 2023-10-12 DIAGNOSIS — O36.0920 ANTI-E ISOIMMUNIZATION AFFECTING PREGNANCY IN SECOND TRIMESTER, SINGLE OR UNSPECIFIED FETUS: ICD-10-CM

## 2023-10-12 DIAGNOSIS — O34.12 UTERINE FIBROIDS AFFECTING PREGNANCY IN SECOND TRIMESTER: ICD-10-CM

## 2023-10-12 DIAGNOSIS — O35.EXX0 PYELECTASIS OF FETUS ON PRENATAL ULTRASOUND: ICD-10-CM

## 2023-10-12 DIAGNOSIS — O09.522 ELDERLY MULTIGRAVIDA IN SECOND TRIMESTER: ICD-10-CM

## 2023-10-12 DIAGNOSIS — O34.592 ANOMALY OF PREGNANT UTERUS, SECOND TRIMESTER: ICD-10-CM

## 2023-10-12 DIAGNOSIS — O99.342 MENTAL DISORDER AFFECTING PREGNANCY IN SECOND TRIMESTER: ICD-10-CM

## 2023-10-12 PROCEDURE — 76816 OB US FOLLOW-UP PER FETUS: CPT | Mod: S$GLB,,, | Performed by: OBSTETRICS & GYNECOLOGY

## 2023-10-12 PROCEDURE — 99214 OFFICE O/P EST MOD 30 MIN: CPT | Mod: 25,S$GLB,, | Performed by: OBSTETRICS & GYNECOLOGY

## 2023-10-12 PROCEDURE — 76816 PR  US,PREGNANT UTERUS,F/U,TRANSABD APP: ICD-10-PCS | Mod: S$GLB,,, | Performed by: OBSTETRICS & GYNECOLOGY

## 2023-10-12 PROCEDURE — 99214 PR OFFICE/OUTPT VISIT, EST, LEVL IV, 30-39 MIN: ICD-10-PCS | Mod: 25,S$GLB,, | Performed by: OBSTETRICS & GYNECOLOGY

## 2023-10-12 NOTE — ASSESSMENT & PLAN NOTE
There are > 50 different red cell antigens that have been associated with hemolytic disease of the fetus and  (HDFN), but the antibodies frequently associated with severe HDFN include those against RhD, Rhc, and [place] (K1). 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   We discussed the pathophysiology of antibody sensitization as well as the risk of fetal anemia and hydrops. HDFN does not usually occur until a critical titer is reached (1:16 at [facility]).     23- too weak to titer  She states she had labs again about 2 weeks ago that were too low to titer. We will confirm with Emilie LEVINE.     Recommendations:   Verification of the father of the babys blood group and antigen phenotype. This will reveal one of the following scenarios:  o If he is does not carry the E antigen (phenotype negative), there is nothing further to do as this fetus will not have inherited the E antigen and will not be at risk for hemolytic disease.      o If he carries the antigen (phenotype positive), may opt to determine genotype/zygosity:  - If he is heterozygous, the fetus has a 50% chance of carrying the antigen. Amniocentesis may offered to verify fetal blood cell antigen genotyping, which is associated with a small risk of miscarriage.    - If he is homozygous for E, then the fetus carries the antigen and management is discussed below.       If the FOB is unknown, declines testing, FOB is homozygous for the E antigen, or if the patient declines amniocentesis, recommend repeat titer at 4-week intervals, using the same lab as the initial titers. If the patients antibody titer rises to a critical value of 1:16, there are two main options:    Without a critical titer, delivery at 39 weeks may be undertaken with no PNT indicated.   Consider maternal blood cross-matching at admission for delivery (if rare antibody and increased risk of hemorrhage) due to potential delay in finding matched units.     Please note that  this management scheme assumes that the patients partner is indeed the father of the baby.

## 2023-10-12 NOTE — ASSESSMENT & PLAN NOTE
Today I counseled the patient on the relationship between maternal age and fetal aneuploidy.  We discussed the risks and benefits of screening tests versus definitive genetic testing (amniocentesis). She was counseled about her specific age-related risk of fetal aneuploidy. We discussed the limitations of ultrasound in the definitive diagnosis of fetal aneuploidy.  I quoted the patient a 1 in 900 procedure related risk of fetal loss with genetic amniocentesis.  We also discussed cell free fetal DNA screening including the sensitivity and specificity of the test.  Her questions were answered and after today's consultation she did not want to pursue amniocentesis.  She completed NIPT- 46,XY  Additionally, we discussed the association between advanced maternal age (AMA) and preeclampsia, gestational diabetes, and fetal growth restriction.    Recommendations:   Detailed anatomic survey at 19-20 weeks     Follow-up fetal ultrasound at 32-34 weeks for interval fetal growth   Consider low dose aspirin at 12-16 weeks for preeclampsia risk reduction

## 2023-10-12 NOTE — ASSESSMENT & PLAN NOTE
I counseled the patient regarding fibroids in pregnancy. She was counseled on the risk for  delivery,  PROM, fibroid degeneration, poor fetal growth, oligohydramnios, vaginal bleeding, and in rare circumstances obstruction of lower uterus/cervix, impeding vaginal delivery.    23- HERNAN intramural fibroid 4.88 x 3.8 x 4.26cm; multiple anterior intramural fibroids with largest measuring 6.01 x 4.37 x 2.36cm  10/12/23: No significant change in fibroids present.    Recommendations:   Fetal growth ultrasound at 32 weeks for patients with a single large fibroid or multiple fibroids  o More frequent ultrasounds can be performed per clinical concern   If concern for degenerating fibroids, can administer a short course of NSAIDs (approximately 48-72 hours of Indocin or ibuprofen) prior to 32 weeks gestation   Document postpartum hemorrhage risk on admission to hospital, ensure T&S sent, and consider type and crossmatch depending on postpartum hemorrhage risk

## 2023-10-12 NOTE — ASSESSMENT & PLAN NOTE
Mild bilateral renal pelvic dilation measuring 4.9mm (right) and 5mm (left) was noted consistent with UTDA1. The kidneys appear normal as does the bladder. She has a low risk cfDNA and this is a male fetus.     The finding of mild renal pelvis dilation was discussed with the patient. We reviewed basic anatomy of the renal collecting system and then discussed possible etiologies for renal pelvis dilation. When dilation of the renal pelvis is borderline or mild, most cases will resolve spontaneously. However, if the renal dilation persists in the  period, the most common conditions that can cause renal pelvis dilation are UPJ obstruction (ureteropelvic junction), UVJ obstruction (ureterovesical junction) and VUR (vesicoureteral reflux). These conditions predispose infants/children to urinary tract infection, but can be effectively followed and treated. I explained that the vast majority of infants with these findings will have spontaneous resolution during pregnancy. We will plan to re-evaluate the kidneys at her next office visit.

## 2023-10-12 NOTE — PROGRESS NOTES
MATERNAL-FETAL MEDICINE   PROGRESS NOTE      SUBJECTIVE:     Ms. Nevin Liz is a 38 y.o.  female with IUP at 23w3d who is seen in consultation by MFM for evaluation and management of:  Problem   Pyelectasis of Fetus On Prenatal Ultrasound   1. 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 AMA in second trimester   3. Anomaly of pregnant uterus, second trimester   5. Uterine fibroids affecting pregnancy in second trimester   6. Anxiety affecting pregnancy in second trimester      Nevin is doing well overall. She reports significant heartburn and is asking for medication. She is already taking supportive measures. She will try pepcid or prilosec OTC.   She has had her titers redrawn at her OB office and states they have been stable at too low to calculate.    No LOF, VB, ctx. +FM. She feels her largest fibroid near her umbilicus.     Medication List with Changes/Refills   Current Medications    ESCITALOPRAM OXALATE (LEXAPRO) 5 MG TAB    Take 1 tablet (5 mg total) by mouth once daily.         Objective:   /73 (BP Location: Right arm)   Pulse (!) 121   Wt 77 kg (169 lb 13.8 oz)   LMP 2023   BMI 27.42 kg/m²     Ultrasound performed. See viewpoint for full ultrasound report.    A viable cano pregnancy is visualized in breech presentation.  Estimated fetal weight is at the 15th percentile with an abdominal circumference at the 15th percentile.    New mild renal peolvis dilation is noted measuring 4.9mm on the right and 5mm on the left. Anatomic survey remains suboptimal for 3VTV. Amniotic fluid volume is normal.  Placenta is anterior.  Multiple fibroids are present as noted above, with the largest being fundal with mean diameter of 5.5cm. The known uterine septum is noted.     ASSESSMENT/PLAN:     38 y.o.  female with IUP at 23w3d     Pyelectasis of fetus on prenatal ultrasound  Mild bilateral renal pelvic dilation measuring 4.9mm (right) and 5mm (left) was noted  consistent with UTDA1. The kidneys appear normal as does the bladder. She has a low risk cfDNA and this is a male fetus.     The finding of mild renal pelvis dilation was discussed with the patient. We reviewed basic anatomy of the renal collecting system and then discussed possible etiologies for renal pelvis dilation. When dilation of the renal pelvis is borderline or mild, most cases will resolve spontaneously. However, if the renal dilation persists in the  period, the most common conditions that can cause renal pelvis dilation are UPJ obstruction (ureteropelvic junction), UVJ obstruction (ureterovesical junction) and VUR (vesicoureteral reflux). These conditions predispose infants/children to urinary tract infection, but can be effectively followed and treated. I explained that the vast majority of infants with these findings will have spontaneous resolution during pregnancy. We will plan to re-evaluate the kidneys at her next office visit.       5. Uterine fibroids affecting pregnancy in second trimester  I counseled the patient regarding fibroids in pregnancy. She was counseled on the risk for  delivery,  PROM, fibroid degeneration, poor fetal growth, oligohydramnios, vaginal bleeding, and in rare circumstances obstruction of lower uterus/cervix, impeding vaginal delivery.    23- HERNAN intramural fibroid 4.88 x 3.8 x 4.26cm; multiple anterior intramural fibroids with largest measuring 6.01 x 4.37 x 2.36cm  10/12/23: No significant change in fibroids present.    Recommendations:  Fetal growth ultrasound at 32 weeks for patients with a single large fibroid or multiple fibroids  More frequent ultrasounds can be performed per clinical concern  If concern for degenerating fibroids, can administer a short course of NSAIDs (approximately 48-72 hours of Indocin or ibuprofen) prior to 32 weeks gestation  Document postpartum hemorrhage risk on admission to hospital, ensure T&S sent, and  consider type and crossmatch depending on postpartum hemorrhage risk      2. AMA in second trimester  Today I counseled the patient on the relationship between maternal age and fetal aneuploidy.  We discussed the risks and benefits of screening tests versus definitive genetic testing (amniocentesis). She was counseled about her specific age-related risk of fetal aneuploidy. We discussed the limitations of ultrasound in the definitive diagnosis of fetal aneuploidy.  I quoted the patient a 1 in 900 procedure related risk of fetal loss with genetic amniocentesis.  We also discussed cell free fetal DNA screening including the sensitivity and specificity of the test.  Her questions were answered and after today's consultation she did not want to pursue amniocentesis.  She completed NIPT- 46,XY  Additionally, we discussed the association between advanced maternal age (AMA) and preeclampsia, gestational diabetes, and fetal growth restriction.    Recommendations:  Detailed anatomic survey at 19-20 weeks    Follow-up fetal ultrasound at 32-34 weeks for interval fetal growth  Consider low dose aspirin at 12-16 weeks for preeclampsia risk reduction        1. 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   We discussed the pathophysiology of antibody sensitization as well as the risk of fetal anemia and hydrops. HDFN does not usually occur until a critical titer is reached (1:16 at [facility]).     23- too weak to titer  She states she had labs again about 2 weeks ago that were too low to titer. We will confirm with Emilie LEVINE.     Recommendations:  Verification of the father of the babys blood group and antigen phenotype. This will  reveal one of the following scenarios:  If he is does not carry the E antigen (phenotype negative), there is nothing further to do as this fetus will not have inherited the E antigen and will not be at risk for hemolytic disease.      If he carries the antigen (phenotype positive), may opt to determine genotype/zygosity:  If he is heterozygous, the fetus has a 50% chance of carrying the antigen. Amniocentesis may offered to verify fetal blood cell antigen genotyping, which is associated with a small risk of miscarriage.    If he is homozygous for E, then the fetus carries the antigen and management is discussed below.      If the FOB is unknown, declines testing, FOB is homozygous for the E antigen, or if the patient declines amniocentesis, recommend repeat titer at 4-week intervals, using the same lab as the initial titers. If the patients antibody titer rises to a critical value of 1:16, there are two main options:   Without a critical titer, delivery at 39 weeks may be undertaken with no PNT indicated.  Consider maternal blood cross-matching at admission for delivery (if rare antibody and increased risk of hemorrhage) due to potential delay in finding matched units.     Please note that this management scheme assumes that the patients partner is indeed the father of the baby.          6. Anxiety affecting pregnancy in second trimester  She reports a history of anxiety and is taking Lexapro 5mg daily. She reports improvement of symptoms with the utilization of this medication regimen. Discussed increased risk for peripartum and postpartum mood disorders. Precautions provided.      We discussed the usage of SSRIs in pregnancy and the minimal risks associated with the fetus.      We have discussed the importance of optimization of mental health conditions during pregnancy in an effort to reduce the risk of postpartum mood disorders. We have discussed options for management including psychotherapy, counseling,  cognitive behavioral therapy, exercise/yoga, journaling, and psychiatry services. She will notify our office if she is interested in being referred for any of the above.      3. Anomaly of pregnant uterus, second trimester  I counseled the patient regarding Mullerian anomalies in pregnancy. Patient was counseled regarding the risk of increased miscarriage rates, second-trimester loss, low birth weight/FGR, malpresentation, and bleeding. Evidence based guidelines for management and prevention of the above are limited.     Septate uterus    Recommendations:  In women without a history of  birth:  Transvaginal cervical length measurement at the time of the anatomic survey (previously completed at Dr Walsh's office and normal; abdominal assessment of cervical length normal)  Fetal growth ultrasound at 32 weeks gestation        FOLLOW UP:   F/u in 8 weeks for US/MFM visit      Leonarda Qureshi MD  Maternal Fetal Medicine

## 2023-12-04 DIAGNOSIS — O36.0930 ANTI-E ISOIMMUNIZATION AFFECTING PREGNANCY IN THIRD TRIMESTER, SINGLE OR UNSPECIFIED FETUS: ICD-10-CM

## 2023-12-04 DIAGNOSIS — O09.523 AMA (ADVANCED MATERNAL AGE) MULTIGRAVIDA 35+, THIRD TRIMESTER: Primary | ICD-10-CM

## 2023-12-06 PROBLEM — O09.523 ELDERLY MULTIGRAVIDA, THIRD TRIMESTER: Status: ACTIVE | Noted: 2023-09-14

## 2023-12-06 PROBLEM — O34.43: Status: ACTIVE | Noted: 2023-09-14

## 2023-12-06 PROBLEM — O36.0930 ANTI-E ISOIMMUNIZATION AFFECTING PREGNANCY IN THIRD TRIMESTER: Status: ACTIVE | Noted: 2023-09-14

## 2023-12-06 PROBLEM — O34.593: Status: ACTIVE | Noted: 2023-09-14

## 2023-12-06 PROBLEM — O99.343 MENTAL DISORDER AFFECTING PREGNANCY IN THIRD TRIMESTER: Status: ACTIVE | Noted: 2023-09-14

## 2023-12-06 PROBLEM — O34.13 UTERINE FIBROIDS AFFECTING PREGNANCY, THIRD TRIMESTER: Status: ACTIVE | Noted: 2023-09-14

## 2023-12-06 NOTE — ASSESSMENT & PLAN NOTE
There are > 50 different red cell antigens that have been associated with hemolytic disease of the fetus and  (HDFN), but the antibodies frequently associated with severe HDFN include those against RhD, Rhc, and [place] (K1). 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   We discussed the pathophysiology of antibody sensitization as well as the risk of fetal anemia and hydrops. HDFN does not usually occur until a critical titer is reached (1:16 at [facility]).     23- too weak to titer  11/15/23- too weak to titer    Recommendations:  Verification of the father of the babys blood group and antigen phenotype. This will reveal one of the following scenarios:  If he is does not carry the E antigen (phenotype negative), there is nothing further to do as this fetus will not have inherited the E antigen and will not be at risk for hemolytic disease.      If he carries the antigen (phenotype positive), may opt to determine genotype/zygosity:  If he is heterozygous, the fetus has a 50% chance of carrying the antigen. Amniocentesis may offered to verify fetal blood cell antigen genotyping, which is associated with a small risk of miscarriage.    If he is homozygous for E, then the fetus carries the antigen and management is discussed below.      If the FOB is unknown, declines testing, FOB is homozygous for the E antigen, or if the patient declines amniocentesis, recommend repeat titer at 4-week intervals, using the same lab as the initial titers. If the patients antibody titer rises to a critical value of 1:16, there are two main options:   Without a critical titer, delivery at 39 weeks may be undertaken with no PNT indicated.  Consider maternal blood cross-matching at admission for delivery (if rare antibody and increased risk of hemorrhage) due to potential delay in finding matched units.     Please note that this management scheme assumes that the patients partner is indeed the father of the baby.

## 2023-12-06 NOTE — ASSESSMENT & PLAN NOTE
I counseled the patient regarding fibroids in pregnancy. She was counseled on the risk for  delivery,  PROM, fibroid degeneration, poor fetal growth, oligohydramnios, vaginal bleeding, and in rare circumstances obstruction of lower uterus/cervix, impeding vaginal delivery.    23- HERNAN intramural fibroid 4.88 x 3.8 x 4.26cm; multiple anterior intramural fibroids with largest measuring 6.01 x 4.37 x 2.36cm  23- Stable in sizes/appearance.    Recommendations:  Fetal growth ultrasound at 32 weeks for patients with a single large fibroid or multiple fibroids  More frequent ultrasounds can be performed per clinical concern  If concern for degenerating fibroids, can administer a short course of NSAIDs (approximately 48-72 hours of Indocin or ibuprofen) prior to 32 weeks gestation  Document postpartum hemorrhage risk on admission to hospital, ensure T&S sent, and consider type and crossmatch depending on postpartum hemorrhage risk

## 2023-12-06 NOTE — PROGRESS NOTES
Maternal Fetal Medicine follow up consult    SUBJECTIVE:     Nevin Liz is a 38 y.o.  female with IUP at 31w3d who is seen in follow up consultation by MFM.  Pregnancy complications include:   Problem   7. Pyelectasis of fetus on prenatal ultrasound   1. 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 AMA multigravida, third trimester   3. Anomaly of pregnant uterus, third trimester   4. History of loop electrosurgical excision procedure (LEEP) of cervix affecting pregnancy, antepartum, third trimester   5. Uterine fibroids affecting pregnancy, third trimester   6. Anxiety affecting pregnancy in third trimester     Nevin is doing well without complaints. She presents today for a routine follow up appointment.  Denies LOF, VB, contractions. Positive fetal movement.    Previous notes reviewed.   No changes to medical, surgical, family, social, or obstetric history.    Interval history since last M visit: see above    Medications:  Current Outpatient Medications   Medication Instructions    EScitalopram oxalate (LEXAPRO) 5 mg, Oral, Daily       Care team members:  Dr Walsh - Primary OB     OBJECTIVE:   /74   Pulse (!) 126   Wt 80.8 kg (178 lb 3.9 oz)   LMP 2023   BMI 28.77 kg/m²     Ultrasound performed. See viewpoint for full ultrasound report.    A viable cano pregnancy is visualized in breech presentation.  Estimated fetal weight is at the 14th percentile with an abdominal circumference at the 14th percentile. Proximal limb shortening is present without frontal bossing or macrocephaly.  Bilateral renal pelvis dilation remains present, measuring 7.9mm (right) and 7.7mm (left) with slight increase from last visit. Amniotic fluid volume is normal.  Placenta is anterior.  Uterine septum is again noted. Multiple moderately sized fibroids are present with the largest having a mean diameter of 5.8cm (anterior/fundal in location).     ASSESSMENT/PLAN:     38 y.o.   female with IUP at 31w3d    1. Anti-E isoimmunization affecting pregnancy in third trimester  There are > 50 different red cell antigens that have been associated with hemolytic disease of the fetus and  (HDFN), but the antibodies frequently associated with severe HDFN include those against RhD, Rhc, and Jeni (K1). 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   We discussed the pathophysiology of antibody sensitization as well as the risk of fetal anemia and hydrops. HDFN does not usually occur until a critical titer is reached (1:16 at [facility]).     23- too weak to titer  11/15/23- too weak to titer    Recommendations:  Verification of the father of the babys blood group and antigen phenotype. This will reveal one of the following scenarios:  If he is does not carry the E antigen (phenotype negative), there is nothing further to do as this fetus will not have inherited the E antigen and will not be at risk for hemolytic disease.      If he carries the antigen (phenotype positive), may opt to determine genotype/zygosity:  If he is heterozygous, the fetus has a 50% chance of carrying the antigen. Amniocentesis may offered to verify fetal blood cell antigen genotyping, which is associated with a small risk of miscarriage.    If he is homozygous for E, then the fetus carries the antigen and management is discussed below.      If the FOB is unknown, declines testing, FOB is homozygous for the E antigen, or if the patient declines amniocentesis, recommend repeat titer at 4-week intervals, using the same lab as the initial titers. If the patients antibody titer rises to a critical value of 1:16, there are two main options:   Without a critical titer, delivery at 39 weeks may be undertaken with no PNT indicated.  Consider maternal blood cross-matching at admission for delivery (if rare antibody and increased risk of hemorrhage) due to potential delay in finding matched units.     Please note that this  management scheme assumes that the patients partner is indeed the father of the baby.          2. AMA multigravida, third trimester  There is a relationship between maternal age and fetal aneuploidy as well as an association between advanced maternal age (AMA) and preeclampsia, gestational diabetes, and fetal growth restriction.    She completed NIPT- 46,XY    Recommendations:  Detailed anatomic survey at 19-20 weeks (completed)    Follow-up fetal ultrasound at 32-34 weeks for interval fetal growth  Consider low dose aspirin at 12-16 weeks for preeclampsia risk reduction        3. Anomaly of pregnant uterus, third trimester  I counseled the patient regarding Mullerian anomalies in pregnancy. Patient was counseled regarding the risk of increased miscarriage rates, second-trimester loss, low birth weight/FGR, malpresentation, and bleeding. Evidence based guidelines for management and prevention of the above are limited.     Septate uterus    Recommendations:  In women without a history of  birth:  Transvaginal cervical length measurement at the time of the anatomic survey (previously completed at Dr Walsh's office and normal; abdominal assessment of cervical length normal)  Fetal growth ultrasound at 32 weeks gestation      4. History of loop electrosurgical excision procedure (LEEP) of cervix affecting pregnancy, antepartum, third trimester  LEEP of the cervix has been associated with premature labor and  PROM. Most such deliveries are late  births (34-37 weeks), and many studies show no significant difference in the frequency of infants with birth weight <2500g.  Adverse outcomes may be associated with the  time interval from procedure to conception.    Cervical length measurement will be performed at the time of anatomy scan. Prophylactic cerclage placement solely for history of LEEP, has not been shown to provide benefit, and it is not recommended.    TVU CL completed at primary OB's  office. Abdominal assessment of cervix normal. TVU was deferred as she already has had TVU at other office and we are able to see it well abdominally.  Notably, she had a full term delivery after LEEP.     Recommendations:  Baseline transvaginal ultrasound cervical length (TVU CL) at anatomy scan (19-20 weeks' gestation)  If TVU CL ?30 mm, then routine prenatal care.  If TVU CL 25-29 mm, then:  Repeat TVU CL in 7-14 days, if unchanged then routine prenatal care. If <25 mm, then see bullets directly below.  If TVU CL <25 mm, then:  Consideration for micronized progesterone 200mg nightly as a vaginal suppository until 37w0d.  Repeat TVU CL every 1-2 weeks until 22w6d for possible further shortening        5. Uterine fibroids affecting pregnancy, third trimester  I counseled the patient regarding fibroids in pregnancy. She was counseled on the risk for  delivery,  PROM, fibroid degeneration, poor fetal growth, oligohydramnios, vaginal bleeding, and in rare circumstances obstruction of lower uterus/cervix, impeding vaginal delivery.    23- HERNAN intramural fibroid 4.88 x 3.8 x 4.26cm; multiple anterior intramural fibroids with largest measuring 6.01 x 4.37 x 2.36cm  23- Stable in sizes/appearance.    Recommendations:  Fetal growth ultrasound at 32 weeks for patients with a single large fibroid or multiple fibroids  More frequent ultrasounds can be performed per clinical concern  If concern for degenerating fibroids, can administer a short course of NSAIDs (approximately 48-72 hours of Indocin or ibuprofen) prior to 32 weeks gestation  Document postpartum hemorrhage risk on admission to hospital, ensure T&S sent, and consider type and crossmatch depending on postpartum hemorrhage risk      6. Anxiety affecting pregnancy in third trimester  She reports a history of anxiety and is taking Lexapro 5mg daily. She reports improvement of symptoms with the utilization of this medication regimen. Discussed  increased risk for peripartum and postpartum mood disorders. Precautions provided.      We discussed the usage of SSRIs in pregnancy and the minimal risks associated with the fetus.      We have discussed the importance of optimization of mental health conditions during pregnancy in an effort to reduce the risk of postpartum mood disorders. We have discussed options for management including psychotherapy, counseling, cognitive behavioral therapy, exercise/yoga, journaling, and psychiatry services. She will notify our office if she is interested in being referred for any of the above.      7. Pyelectasis of fetus on prenatal ultrasound  10/12/23- Mild bilateral renal pelvic dilation measuring 4.9mm (right) and 5mm (left) was noted consistent with UTDA1. The kidneys appear normal as does the bladder. She has a low risk cfDNA and this is a male fetus.  The finding of mild renal pelvis dilation was discussed with the patient. We reviewed basic anatomy of the renal collecting system and then discussed possible etiologies for renal pelvis dilation. When dilation of the renal pelvis is borderline or mild, most cases will resolve spontaneously. However, if the renal dilation persists in the  period, the most common conditions that can cause renal pelvis dilation are UPJ obstruction (ureteropelvic junction), UVJ obstruction (ureterovesical junction) and VUR (vesicoureteral reflux). These conditions predispose infants/children to urinary tract infection, but can be effectively followed and treated. I explained that the vast majority of infants with these findings will have spontaneous resolution during pregnancy. We will plan to re-evaluate the kidneys at her next office visit.     23- Remains present, measuring 7.9mm (right) and 7.7mm (left) with slight increase from last visit. We discussed possible urology consult and she politely declines. I think this is very reasonable.    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 No frontal bossing or mineralization concerns. No bowing of limbs or chest restriction. She has a long torso and short legs and this is likely constitutional.   She was counseled on the limitations of ultrasound.         F/u in 4 weeks for MFM visit/ultrasound    Leonarda Qureshi MD  Maternal Fetal Medicine

## 2023-12-06 NOTE — ASSESSMENT & PLAN NOTE
I counseled the patient regarding Mullerian anomalies in pregnancy. Patient was counseled regarding the risk of increased miscarriage rates, second-trimester loss, low birth weight/FGR, malpresentation, and bleeding. Evidence based guidelines for management and prevention of the above are limited.     Septate uterus    Recommendations:  In women without a history of  birth:  Transvaginal cervical length measurement at the time of the anatomic survey (previously completed at Dr Walsh's office and normal; abdominal assessment of cervical length normal)  Fetal growth ultrasound at 32 weeks gestation

## 2023-12-06 NOTE — ASSESSMENT & PLAN NOTE
LEEP of the cervix has been associated with premature labor and  PROM. Most such deliveries are late  births (34-37 weeks), and many studies show no significant difference in the frequency of infants with birth weight <2500g.  Adverse outcomes may be associated with the  time interval from procedure to conception.    Cervical length measurement will be performed at the time of anatomy scan. Prophylactic cerclage placement solely for history of LEEP, has not been shown to provide benefit, and it is not recommended.    TVU CL completed at primary OB's office. Abdominal assessment of cervix normal. TVU was deferred as she already has had TVU at other office and we are able to see it well abdominally.  Notably, she had a full term delivery after LEEP.     Recommendations:  Baseline transvaginal ultrasound cervical length (TVU CL) at anatomy scan (19-20 weeks' gestation)  If TVU CL ?30 mm, then routine prenatal care.  If TVU CL 25-29 mm, then:  Repeat TVU CL in 7-14 days, if unchanged then routine prenatal care. If <25 mm, then see bullets directly below.  If TVU CL <25 mm, then:  Consideration for micronized progesterone 200mg nightly as a vaginal suppository until 37w0d.  Repeat TVU CL every 1-2 weeks until 22w6d for possible further shortening

## 2023-12-06 NOTE — ASSESSMENT & PLAN NOTE
10/12/23- Mild bilateral renal pelvic dilation measuring 4.9mm (right) and 5mm (left) was noted consistent with UTDA1. The kidneys appear normal as does the bladder. She has a low risk cfDNA and this is a male fetus.  The finding of mild renal pelvis dilation was discussed with the patient. We reviewed basic anatomy of the renal collecting system and then discussed possible etiologies for renal pelvis dilation. When dilation of the renal pelvis is borderline or mild, most cases will resolve spontaneously. However, if the renal dilation persists in the  period, the most common conditions that can cause renal pelvis dilation are UPJ obstruction (ureteropelvic junction), UVJ obstruction (ureterovesical junction) and VUR (vesicoureteral reflux). These conditions predispose infants/children to urinary tract infection, but can be effectively followed and treated. I explained that the vast majority of infants with these findings will have spontaneous resolution during pregnancy. We will plan to re-evaluate the kidneys at her next office visit.     23- Remains present, measuring 7.9mm (right) and 7.7mm (left) with slight increase from last visit. We discussed possible urology consult and she politely declines. I think this is very reasonable.

## 2023-12-06 NOTE — ASSESSMENT & PLAN NOTE
There is a relationship between maternal age and fetal aneuploidy as well as an association between advanced maternal age (AMA) and preeclampsia, gestational diabetes, and fetal growth restriction.    She completed NIPT- 46,XY    Recommendations:  Detailed anatomic survey at 19-20 weeks (completed)    Follow-up fetal ultrasound at 32-34 weeks for interval fetal growth  Consider low dose aspirin at 12-16 weeks for preeclampsia risk reduction

## 2023-12-07 ENCOUNTER — OFFICE VISIT (OUTPATIENT)
Dept: MATERNAL FETAL MEDICINE | Facility: CLINIC | Age: 38
End: 2023-12-07
Payer: COMMERCIAL

## 2023-12-07 ENCOUNTER — PROCEDURE VISIT (OUTPATIENT)
Dept: MATERNAL FETAL MEDICINE | Facility: CLINIC | Age: 38
End: 2023-12-07
Payer: COMMERCIAL

## 2023-12-07 VITALS
BODY MASS INDEX: 28.77 KG/M2 | WEIGHT: 178.25 LBS | DIASTOLIC BLOOD PRESSURE: 74 MMHG | SYSTOLIC BLOOD PRESSURE: 128 MMHG | HEART RATE: 126 BPM

## 2023-12-07 DIAGNOSIS — O34.593: ICD-10-CM

## 2023-12-07 DIAGNOSIS — O34.43: ICD-10-CM

## 2023-12-07 DIAGNOSIS — O36.0930 ANTI-E ISOIMMUNIZATION AFFECTING PREGNANCY IN THIRD TRIMESTER, SINGLE OR UNSPECIFIED FETUS: Primary | ICD-10-CM

## 2023-12-07 DIAGNOSIS — O34.13 UTERINE FIBROIDS AFFECTING PREGNANCY, THIRD TRIMESTER: ICD-10-CM

## 2023-12-07 DIAGNOSIS — O35.EXX0 PYELECTASIS OF FETUS ON PRENATAL ULTRASOUND: ICD-10-CM

## 2023-12-07 DIAGNOSIS — O36.0930 ANTI-E ISOIMMUNIZATION AFFECTING PREGNANCY IN THIRD TRIMESTER, SINGLE OR UNSPECIFIED FETUS: ICD-10-CM

## 2023-12-07 DIAGNOSIS — O09.523 ELDERLY MULTIGRAVIDA, THIRD TRIMESTER: ICD-10-CM

## 2023-12-07 DIAGNOSIS — O09.523 AMA (ADVANCED MATERNAL AGE) MULTIGRAVIDA 35+, THIRD TRIMESTER: ICD-10-CM

## 2023-12-07 DIAGNOSIS — D25.9 UTERINE FIBROIDS AFFECTING PREGNANCY, THIRD TRIMESTER: ICD-10-CM

## 2023-12-07 DIAGNOSIS — O35.HXX0 SHORT LOWER EXTREMITIES OF FETUS AFFECTING ANTEPARTUM CARE OF MOTHER, SINGLE OR UNSPECIFIED FETUS: ICD-10-CM

## 2023-12-07 DIAGNOSIS — O99.343 MENTAL DISORDER AFFECTING PREGNANCY IN THIRD TRIMESTER: ICD-10-CM

## 2023-12-07 PROCEDURE — 76816 PR  US,PREGNANT UTERUS,F/U,TRANSABD APP: ICD-10-PCS | Mod: S$GLB,,, | Performed by: OBSTETRICS & GYNECOLOGY

## 2023-12-07 PROCEDURE — 99214 PR OFFICE/OUTPT VISIT, EST, LEVL IV, 30-39 MIN: ICD-10-PCS | Mod: S$GLB,,, | Performed by: OBSTETRICS & GYNECOLOGY

## 2023-12-07 PROCEDURE — 76816 OB US FOLLOW-UP PER FETUS: CPT | Mod: S$GLB,,, | Performed by: OBSTETRICS & GYNECOLOGY

## 2023-12-07 PROCEDURE — 99214 OFFICE O/P EST MOD 30 MIN: CPT | Mod: S$GLB,,, | Performed by: OBSTETRICS & GYNECOLOGY

## 2023-12-07 NOTE — ASSESSMENT & PLAN NOTE
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 No frontal bossing or mineralization concerns. No bowing of limbs or chest restriction. She has a long torso and short legs and this is likely constitutional.   She was counseled on the limitations of ultrasound.

## 2023-12-10 DIAGNOSIS — F41.9 ANXIETY: Primary | ICD-10-CM

## 2023-12-11 RX ORDER — ESCITALOPRAM OXALATE 5 MG/1
5 TABLET ORAL
Qty: 90 TABLET | Refills: 3 | Status: SHIPPED | OUTPATIENT
Start: 2023-12-11

## 2023-12-27 DIAGNOSIS — O09.523 ELDERLY MULTIGRAVIDA, THIRD TRIMESTER: ICD-10-CM

## 2023-12-27 DIAGNOSIS — O36.0930 ANTI-E ISOIMMUNIZATION AFFECTING PREGNANCY IN THIRD TRIMESTER, SINGLE OR UNSPECIFIED FETUS: Primary | ICD-10-CM

## 2023-12-27 DIAGNOSIS — O34.593: ICD-10-CM

## 2024-01-04 ENCOUNTER — OFFICE VISIT (OUTPATIENT)
Dept: MATERNAL FETAL MEDICINE | Facility: CLINIC | Age: 39
End: 2024-01-04
Payer: COMMERCIAL

## 2024-01-04 ENCOUNTER — PROCEDURE VISIT (OUTPATIENT)
Dept: MATERNAL FETAL MEDICINE | Facility: CLINIC | Age: 39
End: 2024-01-04
Payer: COMMERCIAL

## 2024-01-04 VITALS
DIASTOLIC BLOOD PRESSURE: 70 MMHG | SYSTOLIC BLOOD PRESSURE: 134 MMHG | HEART RATE: 119 BPM | WEIGHT: 181.75 LBS | BODY MASS INDEX: 29.34 KG/M2

## 2024-01-04 DIAGNOSIS — D25.9 UTERINE FIBROIDS AFFECTING PREGNANCY, THIRD TRIMESTER: ICD-10-CM

## 2024-01-04 DIAGNOSIS — O36.0930 ANTI-E ISOIMMUNIZATION AFFECTING PREGNANCY IN THIRD TRIMESTER, SINGLE OR UNSPECIFIED FETUS: Primary | ICD-10-CM

## 2024-01-04 DIAGNOSIS — O35.HXX0 SHORT LOWER EXTREMITIES OF FETUS AFFECTING ANTEPARTUM CARE OF MOTHER, SINGLE OR UNSPECIFIED FETUS: ICD-10-CM

## 2024-01-04 DIAGNOSIS — O99.343 MENTAL DISORDER AFFECTING PREGNANCY IN THIRD TRIMESTER: ICD-10-CM

## 2024-01-04 DIAGNOSIS — O09.523 ELDERLY MULTIGRAVIDA, THIRD TRIMESTER: ICD-10-CM

## 2024-01-04 DIAGNOSIS — O34.13 UTERINE FIBROIDS AFFECTING PREGNANCY, THIRD TRIMESTER: ICD-10-CM

## 2024-01-04 DIAGNOSIS — O35.EXX0 PYELECTASIS OF FETUS ON PRENATAL ULTRASOUND: ICD-10-CM

## 2024-01-04 DIAGNOSIS — O34.593: ICD-10-CM

## 2024-01-04 DIAGNOSIS — O36.0930 ANTI-E ISOIMMUNIZATION AFFECTING PREGNANCY IN THIRD TRIMESTER, SINGLE OR UNSPECIFIED FETUS: ICD-10-CM

## 2024-01-04 DIAGNOSIS — O34.43: ICD-10-CM

## 2024-01-04 PROCEDURE — 99214 OFFICE O/P EST MOD 30 MIN: CPT | Mod: S$GLB,,, | Performed by: OBSTETRICS & GYNECOLOGY

## 2024-01-04 PROCEDURE — 1160F RVW MEDS BY RX/DR IN RCRD: CPT | Mod: CPTII,S$GLB,, | Performed by: OBSTETRICS & GYNECOLOGY

## 2024-01-04 PROCEDURE — 3008F BODY MASS INDEX DOCD: CPT | Mod: CPTII,S$GLB,, | Performed by: OBSTETRICS & GYNECOLOGY

## 2024-01-04 PROCEDURE — 3078F DIAST BP <80 MM HG: CPT | Mod: CPTII,S$GLB,, | Performed by: OBSTETRICS & GYNECOLOGY

## 2024-01-04 PROCEDURE — 76816 OB US FOLLOW-UP PER FETUS: CPT | Mod: S$GLB,,, | Performed by: OBSTETRICS & GYNECOLOGY

## 2024-01-04 PROCEDURE — 1159F MED LIST DOCD IN RCRD: CPT | Mod: CPTII,S$GLB,, | Performed by: OBSTETRICS & GYNECOLOGY

## 2024-01-04 PROCEDURE — 3075F SYST BP GE 130 - 139MM HG: CPT | Mod: CPTII,S$GLB,, | Performed by: OBSTETRICS & GYNECOLOGY

## 2024-01-04 PROCEDURE — 76819 FETAL BIOPHYS PROFIL W/O NST: CPT | Mod: S$GLB,,, | Performed by: OBSTETRICS & GYNECOLOGY

## 2024-01-04 NOTE — ASSESSMENT & PLAN NOTE
I counseled the patient regarding fibroids in pregnancy. She was counseled on the risk for  delivery,  PROM, fibroid degeneration, poor fetal growth, oligohydramnios, vaginal bleeding, and in rare circumstances obstruction of lower uterus/cervix, impeding vaginal delivery.    23- HERNAN intramural fibroid 4.88 x 3.8 x 4.26cm; multiple anterior intramural fibroids with largest measuring 6.01 x 4.37 x 2.36cm  23- Stable in sizes/appearance.  24- Stable in sizes/appearance.    Recommendations:  Fetal growth ultrasound at 32 weeks for patients with a single large fibroid or multiple fibroids  More frequent ultrasounds can be performed per clinical concern  If concern for degenerating fibroids, can administer a short course of NSAIDs (approximately 48-72 hours of Indocin or ibuprofen) prior to 32 weeks gestation  Document postpartum hemorrhage risk on admission to hospital, ensure T&S sent, and consider type and crossmatch depending on postpartum hemorrhage risk

## 2024-01-04 NOTE — ASSESSMENT & PLAN NOTE
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 No frontal bossing or mineralization concerns. No bowing of limbs or chest restriction. She has a long torso and short legs and this is likely constitutional.   She was counseled on the limitations of ultrasound.     1/4/24- She reports her previous child has short long bones. HL 6.6% (3w lag), FL 2.3% (2w4d lag).

## 2024-01-04 NOTE — ASSESSMENT & PLAN NOTE
There are > 50 different red cell antigens that have been associated with hemolytic disease of the fetus and  (HDFN), but the antibodies frequently associated with severe HDFN include those against RhD, Rhc, and [place] (K1). 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   We discussed the pathophysiology of antibody sensitization as well as the risk of fetal anemia and hydrops. HDFN does not usually occur until a critical titer is reached (1:16 at [facility]).     23- too weak to titer  11/15/23- too weak to titer      Recommendations:  Verification of the father of the babys blood group and antigen phenotype. This will reveal one of the following scenarios:  If he is does not carry the E antigen (phenotype negative), there is nothing further to do as this fetus will not have inherited the E antigen and will not be at risk for hemolytic disease.      If he carries the antigen (phenotype positive), may opt to determine genotype/zygosity:  If he is heterozygous, the fetus has a 50% chance of carrying the antigen. Amniocentesis may offered to verify fetal blood cell antigen genotyping, which is associated with a small risk of miscarriage.    If he is homozygous for E, then the fetus carries the antigen and management is discussed below.      If the FOB is unknown, declines testing, FOB is homozygous for the E antigen, or if the patient declines amniocentesis, recommend repeat titer at 4-week intervals, using the same lab as the initial titers. If the patients antibody titer rises to a critical value of 1:16, there are two main options:   Without a critical titer, delivery at 39 weeks may be undertaken with no PNT indicated.  Consider maternal blood cross-matching at admission for delivery (if rare antibody and increased risk of hemorrhage) due to potential delay in finding matched units.     Please note that this management scheme assumes that the patients partner is indeed the father of the baby.

## 2024-01-04 NOTE — ASSESSMENT & PLAN NOTE
There is a relationship between maternal age and fetal aneuploidy as well as an association between advanced maternal age (AMA) and preeclampsia, gestational diabetes, and fetal growth restriction.    She completed NIPT- 46,XY    Recommendations:  Detailed anatomic survey at 19-20 weeks (completed)    Follow-up fetal ultrasound at 32-34 weeks for interval fetal growth (completed)  Consider low dose aspirin at 12-16 weeks for preeclampsia risk reduction

## 2024-01-04 NOTE — PROGRESS NOTES
Maternal Fetal Medicine follow up consult    SUBJECTIVE:     Nevin Liz is a 38 y.o.  female with IUP at 35w3d who is seen in follow up consultation by MFM.  Pregnancy complications include:   Problem   8. Short lower extremities, fetal, affecting care of mother, antepartum   7. Pyelectasis of fetus on prenatal ultrasound   1. 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 AMA multigravida, third trimester   3. Anomaly of pregnant uterus, third trimester   4. History of loop electrosurgical excision procedure (LEEP) of cervix affecting pregnancy, antepartum, third trimester   5. Uterine fibroids affecting pregnancy, third trimester   6. Anxiety affecting pregnancy in third trimester     Nevin presents for routine follow up appointment.   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 We have discussed relief measures.  Denies LOF, VB, contractions. Positive fetal movement.    Previous notes reviewed.   No changes to medical, surgical, family, social, or obstetric history.    Interval history since last M visit: see above    Medications:  Current Outpatient Medications   Medication Instructions    EScitalopram oxalate (LEXAPRO) 5 mg, Oral       Care team members:  Dr Walsh - Primary OB     OBJECTIVE:   /70   Pulse (!) 119   Wt 82.5 kg (181 lb 12.3 oz)   LMP 2023   BMI 29.34 kg/m²     Ultrasound performed. See viewpoint for full ultrasound report.    A viable cano pregnancy is visualized in breech presentation.  Estimated fetal weight is at the 26th percentile with an abdominal circumference at the 56th percentile. Proximal limb shortening is present without frontal bossing or macrocephaly.  Bilateral renal pelvis dilation remains present, measuring 9.7mm (left) and 8.4mm (right) with slight increase from last visit. Amniotic fluid volume is normal.  Placenta is anterior with marginal cord insertion.  Uterine septum is again noted.  Multiple moderately sized fibroids are present with the largest having a mean diameter of 4.4cm (anterior/fundal in location).     ASSESSMENT/PLAN:     38 y.o.  female with IUP at 35w3d    1. 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   We discussed the pathophysiology of antibody sensitization as well as the risk of fetal anemia and hydrops. HDFN does not usually occur until a critical titer is reached (1:16 at [facility]).     23- too weak to titer  11/15/23- too weak to titer      Recommendations:  Verification of the father of the babys blood group and antigen phenotype. This will reveal one of the following scenarios:  If he is does not carry the E antigen (phenotype negative), there is nothing further to do as this fetus will not have inherited the E antigen and will not be at risk for hemolytic disease.      If he carries the antigen (phenotype positive), may opt to determine genotype/zygosity:  If he is heterozygous, the fetus has a 50% chance of carrying the antigen. Amniocentesis may offered to verify fetal blood cell antigen genotyping, which is associated with a small risk of miscarriage.    If he is homozygous for E, then the fetus carries the antigen and management is discussed below.      If the FOB is unknown, declines testing, FOB is homozygous for the E antigen, or if the patient declines amniocentesis, recommend repeat titer at 4-week intervals, using the same lab as the initial titers. If the patients antibody titer rises to a critical value of 1:16, there are two main options:   Without a critical titer, delivery at 39 weeks may be undertaken with no PNT indicated.  Consider maternal blood  cross-matching at admission for delivery (if rare antibody and increased risk of hemorrhage) due to potential delay in finding matched units.     Please note that this management scheme assumes that the patients partner is indeed the father of the baby.          2. AMA multigravida, third trimester  There is a relationship between maternal age and fetal aneuploidy as well as an association between advanced maternal age (AMA) and preeclampsia, gestational diabetes, and fetal growth restriction.    She completed NIPT- 46,XY    Recommendations:  Detailed anatomic survey at 19-20 weeks (completed)    Follow-up fetal ultrasound at 32-34 weeks for interval fetal growth (completed)  Consider low dose aspirin at 12-16 weeks for preeclampsia risk reduction        3. Anomaly of pregnant uterus, third trimester  I counseled the patient regarding Mullerian anomalies in pregnancy. Patient was counseled regarding the risk of increased miscarriage rates, second-trimester loss, low birth weight/FGR, malpresentation, and bleeding. Evidence based guidelines for management and prevention of the above are limited.     Septate uterus    Recommendations:  In women without a history of  birth:  Transvaginal cervical length measurement at the time of the anatomic survey (previously completed at Dr Walsh's office and normal; abdominal assessment of cervical length normal)  Fetal growth ultrasound at 32 weeks gestation      4. History of loop electrosurgical excision procedure (LEEP) of cervix affecting pregnancy, antepartum, third trimester  LEEP of the cervix has been associated with premature labor and  PROM. Most such deliveries are late  births (34-37 weeks), and many studies show no significant difference in the frequency of infants with birth weight <2500g.  Adverse outcomes may be associated with the  time interval from procedure to conception.    Cervical length measurement will be performed at the time of  anatomy scan. Prophylactic cerclage placement solely for history of LEEP, has not been shown to provide benefit, and it is not recommended.    TVU CL completed at primary OB's office. Abdominal assessment of cervix normal. TVU was deferred as she already has had TVU at other office and we are able to see it well abdominally.  Notably, she had a full term delivery after LEEP.     Recommendations:  Baseline transvaginal ultrasound cervical length (TVU CL) at anatomy scan (19-20 weeks' gestation)  If TVU CL ?30 mm, then routine prenatal care.  If TVU CL 25-29 mm, then:  Repeat TVU CL in 7-14 days, if unchanged then routine prenatal care. If <25 mm, then see bullets directly below.  If TVU CL <25 mm, then:  Consideration for micronized progesterone 200mg nightly as a vaginal suppository until 37w0d.  Repeat TVU CL every 1-2 weeks until 22w6d for possible further shortening        5. Uterine fibroids affecting pregnancy, third trimester  I counseled the patient regarding fibroids in pregnancy. She was counseled on the risk for  delivery,  PROM, fibroid degeneration, poor fetal growth, oligohydramnios, vaginal bleeding, and in rare circumstances obstruction of lower uterus/cervix, impeding vaginal delivery.    23- HERNAN intramural fibroid 4.88 x 3.8 x 4.26cm; multiple anterior intramural fibroids with largest measuring 6.01 x 4.37 x 2.36cm  23- Stable in sizes/appearance.  24- Stable in sizes/appearance.    Recommendations:  Fetal growth ultrasound at 32 weeks for patients with a single large fibroid or multiple fibroids  More frequent ultrasounds can be performed per clinical concern  If concern for degenerating fibroids, can administer a short course of NSAIDs (approximately 48-72 hours of Indocin or ibuprofen) prior to 32 weeks gestation  Document postpartum hemorrhage risk on admission to hospital, ensure T&S sent, and consider type and crossmatch depending on postpartum hemorrhage  risk      6. Anxiety affecting pregnancy in third trimester  She reports a history of anxiety and is taking Lexapro 5mg daily. She reports improvement of symptoms with the utilization of this medication regimen. Discussed increased risk for peripartum and postpartum mood disorders. Precautions provided.      We discussed the usage of SSRIs in pregnancy and the minimal risks associated with the fetus.      We have discussed the importance of optimization of mental health conditions during pregnancy in an effort to reduce the risk of postpartum mood disorders. We have discussed options for management including psychotherapy, counseling, cognitive behavioral therapy, exercise/yoga, journaling, and psychiatry services. She will notify our office if she is interested in being referred for any of the above.      7. Pyelectasis of fetus on prenatal ultrasound  10/12/23- Mild bilateral renal pelvic dilation measuring 4.9mm (right) and 5mm (left) was noted consistent with UTDA1. The kidneys appear normal as does the bladder. She has a low risk cfDNA and this is a male fetus.  The finding of mild renal pelvis dilation was discussed with the patient. We reviewed basic anatomy of the renal collecting system and then discussed possible etiologies for renal pelvis dilation. When dilation of the renal pelvis is borderline or mild, most cases will resolve spontaneously. However, if the renal dilation persists in the  period, the most common conditions that can cause renal pelvis dilation are UPJ obstruction (ureteropelvic junction), UVJ obstruction (ureterovesical junction) and VUR (vesicoureteral reflux). These conditions predispose infants/children to urinary tract infection, but can be effectively followed and treated. I explained that the vast majority of infants with these findings will have spontaneous resolution during pregnancy. We will plan to re-evaluate the kidneys at her next office visit.     23- Remains  present, measuring 7.9mm (right) and 7.7mm (left) with slight increase from last visit. We discussed possible urology consult and she politely declines. I think this is very reasonable.  1/4/24- Remains present,  8.4mm (right) and 9.7mm (left).     8. 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 No frontal bossing or mineralization concerns. No bowing of limbs or chest restriction. She has a long torso and short legs and this is likely constitutional.   She was counseled on the limitations of ultrasound.     1/4/24- She reports her previous child has short long bones. HL 6.6% (3w lag), FL 2.3% (2w4d lag).       We have not scheduled any follow-up with MFM at this time, but would be happy to see her again should any questions, concerns, or issues arise.      Leonarda Qureshi MD  Maternal Fetal Medicine

## 2024-01-04 NOTE — ASSESSMENT & PLAN NOTE
10/12/23- Mild bilateral renal pelvic dilation measuring 4.9mm (right) and 5mm (left) was noted consistent with UTDA1. The kidneys appear normal as does the bladder. She has a low risk cfDNA and this is a male fetus.  The finding of mild renal pelvis dilation was discussed with the patient. We reviewed basic anatomy of the renal collecting system and then discussed possible etiologies for renal pelvis dilation. When dilation of the renal pelvis is borderline or mild, most cases will resolve spontaneously. However, if the renal dilation persists in the  period, the most common conditions that can cause renal pelvis dilation are UPJ obstruction (ureteropelvic junction), UVJ obstruction (ureterovesical junction) and VUR (vesicoureteral reflux). These conditions predispose infants/children to urinary tract infection, but can be effectively followed and treated. I explained that the vast majority of infants with these findings will have spontaneous resolution during pregnancy. We will plan to re-evaluate the kidneys at her next office visit.     23- Remains present, measuring 7.9mm (right) and 7.7mm (left) with slight increase from last visit. We discussed possible urology consult and she politely declines. I think this is very reasonable.  24- Remains present,  8.4mm (right) and 9.7mm (left).

## 2024-01-10 LAB — PRENATAL STREP B CULTURE: NORMAL

## 2024-01-17 ENCOUNTER — HOSPITAL ENCOUNTER (INPATIENT)
Facility: HOSPITAL | Age: 39
LOS: 2 days | Discharge: HOME | End: 2024-01-19
Attending: OBSTETRICS & GYNECOLOGY | Admitting: OBSTETRICS & GYNECOLOGY
Payer: COMMERCIAL

## 2024-01-17 ENCOUNTER — ANESTHESIA (OUTPATIENT)
Dept: OBSTETRICS AND GYNECOLOGY | Facility: HOSPITAL | Age: 39
End: 2024-01-17
Payer: COMMERCIAL

## 2024-01-17 ENCOUNTER — ANESTHESIA EVENT (OUTPATIENT)
Dept: OBSTETRICS AND GYNECOLOGY | Facility: HOSPITAL | Age: 39
End: 2024-01-17
Payer: COMMERCIAL

## 2024-01-17 DIAGNOSIS — O13.9 GESTATIONAL HYPERTENSION: ICD-10-CM

## 2024-01-17 DIAGNOSIS — O09.523 ELDERLY MULTIGRAVIDA, THIRD TRIMESTER: ICD-10-CM

## 2024-01-17 DIAGNOSIS — O36.0930: ICD-10-CM

## 2024-01-17 PROBLEM — Z98.891 PREVIOUS CESAREAN SECTION: Status: ACTIVE | Noted: 2024-01-17

## 2024-01-17 LAB
ANTIBODY IDENTIFICATION: NORMAL
BASOPHILS # BLD AUTO: 0.02 X10(3)/MCL
BASOPHILS NFR BLD AUTO: 0.2 %
EOSINOPHIL # BLD AUTO: 0.08 X10(3)/MCL (ref 0–0.9)
EOSINOPHIL NFR BLD AUTO: 0.6 %
ERYTHROCYTE [DISTWIDTH] IN BLOOD BY AUTOMATED COUNT: 15.2 % (ref 11.5–17)
GROUP & RH: ABNORMAL
HCT VFR BLD AUTO: 33.4 % (ref 37–47)
HGB BLD-MCNC: 10.5 G/DL (ref 12–16)
IMM GRANULOCYTES # BLD AUTO: 0.07 X10(3)/MCL (ref 0–0.04)
IMM GRANULOCYTES NFR BLD AUTO: 0.6 %
INDIRECT COOMBS: ABNORMAL
KLEIHAUER-BETKE STAIN: NORMAL
LYMPHOCYTES # BLD AUTO: 1.46 X10(3)/MCL (ref 0.6–4.6)
LYMPHOCYTES NFR BLD AUTO: 11.9 %
MCH RBC QN AUTO: 25.4 PG (ref 27–31)
MCHC RBC AUTO-ENTMCNC: 31.4 G/DL (ref 33–36)
MCV RBC AUTO: 80.9 FL (ref 80–94)
MONOCYTES # BLD AUTO: 0.82 X10(3)/MCL (ref 0.1–1.3)
MONOCYTES NFR BLD AUTO: 6.7 %
NEUTROPHILS # BLD AUTO: 9.86 X10(3)/MCL (ref 2.1–9.2)
NEUTROPHILS NFR BLD AUTO: 80 %
NRBC BLD AUTO-RTO: 0 %
PLATELET # BLD AUTO: 410 X10(3)/MCL (ref 130–400)
PMV BLD AUTO: 9.6 FL (ref 7.4–10.4)
RBC # BLD AUTO: 4.13 X10(6)/MCL (ref 4.2–5.4)
ROSETTE - FMH (FETAL BLEED SCREEN): NORMAL
SPECIMEN OUTDATE: ABNORMAL
T PALLIDUM AB SER QL: NONREACTIVE
WBC # SPEC AUTO: 12.31 X10(3)/MCL (ref 4.5–11.5)

## 2024-01-17 PROCEDURE — 63600175 PHARM REV CODE 636 W HCPCS: Performed by: NURSE ANESTHETIST, CERTIFIED REGISTERED

## 2024-01-17 PROCEDURE — 51702 INSERT TEMP BLADDER CATH: CPT

## 2024-01-17 PROCEDURE — 25000003 PHARM REV CODE 250: Performed by: NURSE ANESTHETIST, CERTIFIED REGISTERED

## 2024-01-17 PROCEDURE — 63600175 PHARM REV CODE 636 W HCPCS: Mod: JZ,JG | Performed by: ANESTHESIOLOGY

## 2024-01-17 PROCEDURE — 63600175 PHARM REV CODE 636 W HCPCS: Mod: JZ,JG | Performed by: OBSTETRICS & GYNECOLOGY

## 2024-01-17 PROCEDURE — 86850 RBC ANTIBODY SCREEN: CPT | Performed by: OBSTETRICS & GYNECOLOGY

## 2024-01-17 PROCEDURE — 63600175 PHARM REV CODE 636 W HCPCS: Performed by: OBSTETRICS & GYNECOLOGY

## 2024-01-17 PROCEDURE — 86870 RBC ANTIBODY IDENTIFICATION: CPT | Performed by: OBSTETRICS & GYNECOLOGY

## 2024-01-17 PROCEDURE — 36004725 HC OB OR TIME LEV III - EA ADD 15 MIN: Performed by: OBSTETRICS & GYNECOLOGY

## 2024-01-17 PROCEDURE — 25000003 PHARM REV CODE 250: Performed by: OBSTETRICS & GYNECOLOGY

## 2024-01-17 PROCEDURE — 63600175 PHARM REV CODE 636 W HCPCS: Performed by: ANESTHESIOLOGY

## 2024-01-17 PROCEDURE — 59514 CESAREAN DELIVERY ONLY: CPT | Mod: QY,ANES,, | Performed by: ANESTHESIOLOGY

## 2024-01-17 PROCEDURE — 27000492 HC SLEEVE, SCD T/L

## 2024-01-17 PROCEDURE — 11000001 HC ACUTE MED/SURG PRIVATE ROOM

## 2024-01-17 PROCEDURE — 85461 HEMOGLOBIN FETAL: CPT | Performed by: OBSTETRICS & GYNECOLOGY

## 2024-01-17 PROCEDURE — 37000008 HC ANESTHESIA 1ST 15 MINUTES: Performed by: OBSTETRICS & GYNECOLOGY

## 2024-01-17 PROCEDURE — 36004724 HC OB OR TIME LEV III - 1ST 15 MIN: Performed by: OBSTETRICS & GYNECOLOGY

## 2024-01-17 PROCEDURE — 71000033 HC RECOVERY, INTIAL HOUR: Performed by: OBSTETRICS & GYNECOLOGY

## 2024-01-17 PROCEDURE — 27200918 HC ALEXIS O RING

## 2024-01-17 PROCEDURE — 37000009 HC ANESTHESIA EA ADD 15 MINS: Performed by: OBSTETRICS & GYNECOLOGY

## 2024-01-17 PROCEDURE — 85025 COMPLETE CBC W/AUTO DIFF WBC: CPT | Performed by: OBSTETRICS & GYNECOLOGY

## 2024-01-17 PROCEDURE — 59514 CESAREAN DELIVERY ONLY: CPT | Mod: QX,CRNA,, | Performed by: NURSE ANESTHETIST, CERTIFIED REGISTERED

## 2024-01-17 PROCEDURE — 86780 TREPONEMA PALLIDUM: CPT | Performed by: OBSTETRICS & GYNECOLOGY

## 2024-01-17 PROCEDURE — 85460 HEMOGLOBIN FETAL: CPT | Performed by: OBSTETRICS & GYNECOLOGY

## 2024-01-17 RX ORDER — OXYCODONE AND ACETAMINOPHEN 5; 325 MG/1; MG/1
1 TABLET ORAL EVERY 4 HOURS PRN
Status: DISCONTINUED | OUTPATIENT
Start: 2024-01-17 | End: 2024-01-19 | Stop reason: HOSPADM

## 2024-01-17 RX ORDER — ESCITALOPRAM OXALATE 5 MG/1
5 TABLET ORAL DAILY
Status: DISCONTINUED | OUTPATIENT
Start: 2024-01-18 | End: 2024-01-19 | Stop reason: HOSPADM

## 2024-01-17 RX ORDER — MISOPROSTOL 100 UG/1
800 TABLET ORAL
Status: DISCONTINUED | OUTPATIENT
Start: 2024-01-17 | End: 2024-01-17

## 2024-01-17 RX ORDER — METHYLERGONOVINE MALEATE 0.2 MG/ML
200 INJECTION INTRAVENOUS ONCE AS NEEDED
Status: DISCONTINUED | OUTPATIENT
Start: 2024-01-17 | End: 2024-01-19 | Stop reason: HOSPADM

## 2024-01-17 RX ORDER — MISOPROSTOL 100 UG/1
800 TABLET ORAL ONCE AS NEEDED
Status: DISCONTINUED | OUTPATIENT
Start: 2024-01-17 | End: 2024-01-19 | Stop reason: HOSPADM

## 2024-01-17 RX ORDER — OXYTOCIN/RINGER'S LACTATE 30/500 ML
95 PLASTIC BAG, INJECTION (ML) INTRAVENOUS ONCE
Status: COMPLETED | OUTPATIENT
Start: 2024-01-17 | End: 2024-01-17

## 2024-01-17 RX ORDER — PROMETHAZINE HYDROCHLORIDE 25 MG/1
25 TABLET ORAL EVERY 6 HOURS PRN
Status: DISCONTINUED | OUTPATIENT
Start: 2024-01-17 | End: 2024-01-19 | Stop reason: HOSPADM

## 2024-01-17 RX ORDER — FAMOTIDINE 10 MG/ML
20 INJECTION INTRAVENOUS
Status: DISCONTINUED | OUTPATIENT
Start: 2024-01-17 | End: 2024-01-17

## 2024-01-17 RX ORDER — PHENYLEPHRINE HYDROCHLORIDE 10 MG/ML
INJECTION INTRAVENOUS
Status: DISCONTINUED | OUTPATIENT
Start: 2024-01-17 | End: 2024-01-17

## 2024-01-17 RX ORDER — CARBOPROST TROMETHAMINE 250 UG/ML
250 INJECTION, SOLUTION INTRAMUSCULAR
Status: DISCONTINUED | OUTPATIENT
Start: 2024-01-17 | End: 2024-01-17

## 2024-01-17 RX ORDER — OXYTOCIN/RINGER'S LACTATE 30/500 ML
95 PLASTIC BAG, INJECTION (ML) INTRAVENOUS ONCE
Status: DISCONTINUED | OUTPATIENT
Start: 2024-01-17 | End: 2024-01-17

## 2024-01-17 RX ORDER — ONDANSETRON HYDROCHLORIDE 2 MG/ML
INJECTION, SOLUTION INTRAVENOUS
Status: DISCONTINUED | OUTPATIENT
Start: 2024-01-17 | End: 2024-01-17

## 2024-01-17 RX ORDER — KETOROLAC TROMETHAMINE 30 MG/ML
30 INJECTION, SOLUTION INTRAMUSCULAR; INTRAVENOUS EVERY 8 HOURS
Status: DISCONTINUED | OUTPATIENT
Start: 2024-01-17 | End: 2024-01-18

## 2024-01-17 RX ORDER — PRENATAL WITH FERROUS FUM AND FOLIC ACID 3080; 920; 120; 400; 22; 1.84; 3; 20; 10; 1; 12; 200; 27; 25; 2 [IU]/1; [IU]/1; MG/1; [IU]/1; MG/1; MG/1; MG/1; MG/1; MG/1; MG/1; UG/1; MG/1; MG/1; MG/1; MG/1
1 TABLET ORAL DAILY
Status: DISCONTINUED | OUTPATIENT
Start: 2024-01-18 | End: 2024-01-19 | Stop reason: HOSPADM

## 2024-01-17 RX ORDER — OXYTOCIN 10 [USP'U]/ML
10 INJECTION, SOLUTION INTRAMUSCULAR; INTRAVENOUS ONCE AS NEEDED
Status: DISCONTINUED | OUTPATIENT
Start: 2024-01-17 | End: 2024-01-19 | Stop reason: HOSPADM

## 2024-01-17 RX ORDER — SODIUM CITRATE AND CITRIC ACID MONOHYDRATE 334; 500 MG/5ML; MG/5ML
30 SOLUTION ORAL ONCE
Status: DISCONTINUED | OUTPATIENT
Start: 2024-01-17 | End: 2024-01-17

## 2024-01-17 RX ORDER — DIPHENOXYLATE HYDROCHLORIDE AND ATROPINE SULFATE 2.5; .025 MG/1; MG/1
2 TABLET ORAL EVERY 6 HOURS PRN
Status: DISCONTINUED | OUTPATIENT
Start: 2024-01-17 | End: 2024-01-19 | Stop reason: HOSPADM

## 2024-01-17 RX ORDER — DIPHENHYDRAMINE HCL 25 MG
25 CAPSULE ORAL EVERY 4 HOURS PRN
Status: DISCONTINUED | OUTPATIENT
Start: 2024-01-17 | End: 2024-01-19 | Stop reason: HOSPADM

## 2024-01-17 RX ORDER — ADHESIVE BANDAGE
30 BANDAGE TOPICAL 2 TIMES DAILY PRN
Status: DISCONTINUED | OUTPATIENT
Start: 2024-01-18 | End: 2024-01-19 | Stop reason: HOSPADM

## 2024-01-17 RX ORDER — CARBOPROST TROMETHAMINE 250 UG/ML
250 INJECTION, SOLUTION INTRAMUSCULAR
Status: DISCONTINUED | OUTPATIENT
Start: 2024-01-17 | End: 2024-01-19 | Stop reason: HOSPADM

## 2024-01-17 RX ORDER — HYDROMORPHONE HYDROCHLORIDE 2 MG/ML
1 INJECTION, SOLUTION INTRAMUSCULAR; INTRAVENOUS; SUBCUTANEOUS EVERY 4 HOURS PRN
Status: DISCONTINUED | OUTPATIENT
Start: 2024-01-17 | End: 2024-01-19 | Stop reason: HOSPADM

## 2024-01-17 RX ORDER — AMOXICILLIN 250 MG
1 CAPSULE ORAL NIGHTLY PRN
Status: DISCONTINUED | OUTPATIENT
Start: 2024-01-17 | End: 2024-01-19 | Stop reason: HOSPADM

## 2024-01-17 RX ORDER — METHYLERGONOVINE MALEATE 0.2 MG/ML
200 INJECTION INTRAVENOUS
Status: DISCONTINUED | OUTPATIENT
Start: 2024-01-17 | End: 2024-01-17

## 2024-01-17 RX ORDER — OXYTOCIN/RINGER'S LACTATE 30/500 ML
334 PLASTIC BAG, INJECTION (ML) INTRAVENOUS ONCE AS NEEDED
Status: DISCONTINUED | OUTPATIENT
Start: 2024-01-17 | End: 2024-01-19 | Stop reason: HOSPADM

## 2024-01-17 RX ORDER — MORPHINE SULFATE 4 MG/ML
2 INJECTION, SOLUTION INTRAMUSCULAR; INTRAVENOUS EVERY 5 MIN PRN
Status: DISCONTINUED | OUTPATIENT
Start: 2024-01-18 | End: 2024-01-18

## 2024-01-17 RX ORDER — EPHEDRINE SULFATE 50 MG/ML
10 INJECTION, SOLUTION INTRAVENOUS
Status: ACTIVE | OUTPATIENT
Start: 2024-01-17 | End: 2024-01-17

## 2024-01-17 RX ORDER — ACETAMINOPHEN 10 MG/ML
INJECTION, SOLUTION INTRAVENOUS
Status: DISCONTINUED | OUTPATIENT
Start: 2024-01-17 | End: 2024-01-17

## 2024-01-17 RX ORDER — ONDANSETRON HYDROCHLORIDE 2 MG/ML
4 INJECTION, SOLUTION INTRAVENOUS EVERY 6 HOURS PRN
Status: ACTIVE | OUTPATIENT
Start: 2024-01-17 | End: 2024-01-18

## 2024-01-17 RX ORDER — BISACODYL 10 MG/1
10 SUPPOSITORY RECTAL ONCE AS NEEDED
Status: DISCONTINUED | OUTPATIENT
Start: 2024-01-17 | End: 2024-01-19 | Stop reason: HOSPADM

## 2024-01-17 RX ORDER — ACETAMINOPHEN 325 MG/1
650 TABLET ORAL EVERY 6 HOURS
Status: DISCONTINUED | OUTPATIENT
Start: 2024-01-17 | End: 2024-01-18

## 2024-01-17 RX ORDER — SODIUM CHLORIDE, SODIUM LACTATE, POTASSIUM CHLORIDE, CALCIUM CHLORIDE 600; 310; 30; 20 MG/100ML; MG/100ML; MG/100ML; MG/100ML
INJECTION, SOLUTION INTRAVENOUS CONTINUOUS
Status: DISCONTINUED | OUTPATIENT
Start: 2024-01-17 | End: 2024-01-17

## 2024-01-17 RX ORDER — SIMETHICONE 80 MG
1 TABLET,CHEWABLE ORAL EVERY 6 HOURS PRN
Status: DISCONTINUED | OUTPATIENT
Start: 2024-01-17 | End: 2024-01-19 | Stop reason: HOSPADM

## 2024-01-17 RX ORDER — OXYTOCIN/RINGER'S LACTATE 30/500 ML
95 PLASTIC BAG, INJECTION (ML) INTRAVENOUS ONCE AS NEEDED
Status: DISCONTINUED | OUTPATIENT
Start: 2024-01-17 | End: 2024-01-19 | Stop reason: HOSPADM

## 2024-01-17 RX ORDER — OXYCODONE HYDROCHLORIDE 5 MG/1
10 TABLET ORAL EVERY 4 HOURS PRN
Status: ACTIVE | OUTPATIENT
Start: 2024-01-17 | End: 2024-01-18

## 2024-01-17 RX ORDER — OXYTOCIN/RINGER'S LACTATE 30/500 ML
334 PLASTIC BAG, INJECTION (ML) INTRAVENOUS ONCE
Status: DISCONTINUED | OUTPATIENT
Start: 2024-01-17 | End: 2024-01-17

## 2024-01-17 RX ORDER — BUPIVACAINE HYDROCHLORIDE 7.5 MG/ML
INJECTION, SOLUTION EPIDURAL; RETROBULBAR
Status: COMPLETED | OUTPATIENT
Start: 2024-01-17 | End: 2024-01-17

## 2024-01-17 RX ORDER — IBUPROFEN 800 MG/1
800 TABLET ORAL EVERY 8 HOURS
Status: DISCONTINUED | OUTPATIENT
Start: 2024-01-18 | End: 2024-01-18

## 2024-01-17 RX ORDER — ONDANSETRON 4 MG/1
8 TABLET, ORALLY DISINTEGRATING ORAL EVERY 8 HOURS PRN
Status: DISCONTINUED | OUTPATIENT
Start: 2024-01-17 | End: 2024-01-19 | Stop reason: HOSPADM

## 2024-01-17 RX ORDER — OXYCODONE HYDROCHLORIDE 5 MG/1
5 TABLET ORAL EVERY 4 HOURS PRN
Status: ACTIVE | OUTPATIENT
Start: 2024-01-17 | End: 2024-01-18

## 2024-01-17 RX ORDER — SODIUM CITRATE AND CITRIC ACID MONOHYDRATE 334; 500 MG/5ML; MG/5ML
30 SOLUTION ORAL
Status: DISCONTINUED | OUTPATIENT
Start: 2024-01-17 | End: 2024-01-17

## 2024-01-17 RX ORDER — PROCHLORPERAZINE EDISYLATE 5 MG/ML
5 INJECTION INTRAMUSCULAR; INTRAVENOUS EVERY 6 HOURS PRN
Status: DISCONTINUED | OUTPATIENT
Start: 2024-01-17 | End: 2024-01-19 | Stop reason: HOSPADM

## 2024-01-17 RX ORDER — DOCUSATE SODIUM 100 MG/1
200 CAPSULE, LIQUID FILLED ORAL 2 TIMES DAILY
Status: DISCONTINUED | OUTPATIENT
Start: 2024-01-17 | End: 2024-01-19 | Stop reason: HOSPADM

## 2024-01-17 RX ORDER — MORPHINE SULFATE 0.5 MG/ML
INJECTION, SOLUTION EPIDURAL; INTRATHECAL; INTRAVENOUS
Status: DISCONTINUED | OUTPATIENT
Start: 2024-01-17 | End: 2024-01-17

## 2024-01-17 RX ORDER — MUPIROCIN 20 MG/G
OINTMENT TOPICAL
Status: DISCONTINUED | OUTPATIENT
Start: 2024-01-17 | End: 2024-01-17

## 2024-01-17 RX ORDER — DEXAMETHASONE SODIUM PHOSPHATE 4 MG/ML
INJECTION, SOLUTION INTRA-ARTICULAR; INTRALESIONAL; INTRAMUSCULAR; INTRAVENOUS; SOFT TISSUE
Status: DISCONTINUED | OUTPATIENT
Start: 2024-01-17 | End: 2024-01-17

## 2024-01-17 RX ORDER — EPHEDRINE SULFATE 50 MG/ML
INJECTION, SOLUTION INTRAVENOUS
Status: DISCONTINUED | OUTPATIENT
Start: 2024-01-17 | End: 2024-01-17

## 2024-01-17 RX ORDER — OXYTOCIN/RINGER'S LACTATE 30/500 ML
PLASTIC BAG, INJECTION (ML) INTRAVENOUS
Status: DISCONTINUED | OUTPATIENT
Start: 2024-01-17 | End: 2024-01-17

## 2024-01-17 RX ORDER — FENTANYL CITRATE 50 UG/ML
INJECTION, SOLUTION INTRAMUSCULAR; INTRAVENOUS
Status: DISCONTINUED | OUTPATIENT
Start: 2024-01-17 | End: 2024-01-17

## 2024-01-17 RX ORDER — OXYCODONE AND ACETAMINOPHEN 10; 325 MG/1; MG/1
1 TABLET ORAL EVERY 4 HOURS PRN
Status: DISCONTINUED | OUTPATIENT
Start: 2024-01-17 | End: 2024-01-19 | Stop reason: HOSPADM

## 2024-01-17 RX ORDER — CLINDAMYCIN PHOSPHATE 900 MG/50ML
900 INJECTION, SOLUTION INTRAVENOUS
Status: COMPLETED | OUTPATIENT
Start: 2024-01-17 | End: 2024-01-17

## 2024-01-17 RX ADMIN — PROCHLORPERAZINE EDISYLATE 5 MG: 5 INJECTION INTRAMUSCULAR; INTRAVENOUS at 08:01

## 2024-01-17 RX ADMIN — MORPHINE SULFATE 0.1 MG: 0.5 INJECTION, SOLUTION EPIDURAL; INTRATHECAL; INTRAVENOUS at 03:01

## 2024-01-17 RX ADMIN — GENTAMICIN SULFATE 332 MG: 40 INJECTION, SOLUTION INTRAMUSCULAR; INTRAVENOUS at 03:01

## 2024-01-17 RX ADMIN — OXYTOCIN 95 MILLI-UNITS/MIN: 10 INJECTION, SOLUTION INTRAMUSCULAR; INTRAVENOUS at 05:01

## 2024-01-17 RX ADMIN — ONDANSETRON 4 MG: 2 INJECTION INTRAMUSCULAR; INTRAVENOUS at 03:01

## 2024-01-17 RX ADMIN — SODIUM CHLORIDE, POTASSIUM CHLORIDE, SODIUM LACTATE AND CALCIUM CHLORIDE: 600; 310; 30; 20 INJECTION, SOLUTION INTRAVENOUS at 03:01

## 2024-01-17 RX ADMIN — FENTANYL CITRATE 10 MCG: 50 INJECTION, SOLUTION INTRAMUSCULAR; INTRAVENOUS at 03:01

## 2024-01-17 RX ADMIN — PHENYLEPHRINE HYDROCHLORIDE 200 MCG: 10 INJECTION INTRAVENOUS at 04:01

## 2024-01-17 RX ADMIN — EPHEDRINE SULFATE 50 MG: 50 INJECTION INTRAVENOUS at 03:01

## 2024-01-17 RX ADMIN — HYDROMORPHONE HYDROCHLORIDE 1 MG: 2 INJECTION INTRAMUSCULAR; INTRAVENOUS; SUBCUTANEOUS at 08:01

## 2024-01-17 RX ADMIN — DEXAMETHASONE SODIUM PHOSPHATE 4 MG: 4 INJECTION, SOLUTION INTRA-ARTICULAR; INTRALESIONAL; INTRAMUSCULAR; INTRAVENOUS; SOFT TISSUE at 03:01

## 2024-01-17 RX ADMIN — PHENYLEPHRINE HYDROCHLORIDE 100 MCG: 10 INJECTION INTRAVENOUS at 04:01

## 2024-01-17 RX ADMIN — CLINDAMYCIN PHOSPHATE 900 MG: 900 INJECTION, SOLUTION INTRAVENOUS at 03:01

## 2024-01-17 RX ADMIN — BUPIVACAINE HYDROCHLORIDE 1.8 ML: 7.5 INJECTION, SOLUTION EPIDURAL; RETROBULBAR at 03:01

## 2024-01-17 RX ADMIN — PHENYLEPHRINE HYDROCHLORIDE 100 MCG: 10 INJECTION INTRAVENOUS at 03:01

## 2024-01-17 RX ADMIN — ACETAMINOPHEN 1000 MG: 10 INJECTION, SOLUTION INTRAVENOUS at 04:01

## 2024-01-17 RX ADMIN — KETOROLAC TROMETHAMINE 30 MG: 30 INJECTION, SOLUTION INTRAMUSCULAR; INTRAVENOUS at 10:01

## 2024-01-17 RX ADMIN — Medication 30 UNITS: at 03:01

## 2024-01-17 NOTE — TRANSFER OF CARE
Anesthesia Transfer of Care Note    Patient: Nevin Liz    Procedure(s) Performed: Procedure(s) (LRB):   SECTION (N/A)    Patient location: Labor and Delivery    Anesthesia Type: spinal    Transport from OR: Transported from OR on room air with adequate spontaneous ventilation    Post pain: adequate analgesia    Post assessment: no apparent anesthetic complications and tolerated procedure well    Post vital signs: stable    Level of consciousness: awake, alert and oriented    Nausea/Vomiting: no nausea/vomiting    Complications: none    Transfer of care protocol was followed

## 2024-01-17 NOTE — ANESTHESIA PROCEDURE NOTES
Spinal    Diagnosis: Previous C Section  Patient location during procedure: OR  Start time: 1/17/2024 3:35 PM  Timeout: 1/17/2024 3:34 PM  End time: 1/17/2024 3:40 PM    Staffing  Authorizing Provider: Js Cuba MD  Performing Provider: Js Cuba MD    Staffing  Performed by: Js Cuba MD  Authorized by: Js Cuba MD    Preanesthetic Checklist  Completed: patient identified, IV checked, site marked, risks and benefits discussed, surgical consent, monitors and equipment checked, pre-op evaluation and timeout performed  Spinal Block  Patient position: sitting  Prep: ChloraPrep  Patient monitoring: heart rate, cardiac monitor, continuous pulse ox and frequent blood pressure checks  Approach: right paramedian  Location: L4-5  Injection technique: single shot  CSF Fluid: clear free-flowing CSF  Needle  Needle type: pencil-tip   Needle gauge: 25 G  Needle length: 3.5 in  Additional Documentation: incremental injection, negative aspiration for heme, no paresthesia on injection and left transient paresthesia  Needle localization: anatomical landmarks  Assessment  Sensory level: T5   Dermatomal levels determined by alcohol wipe  Ease of block: easy and moderate  Additional Notes  Sitting position, usual prep & drape, local to skin & subq 25 ga 1% xylocaine (3 ml)  Introducer in, 25 ga Pencan needle, clear csf on 4th pass, R paramedian, L sided paresthesia, < 2 seconds, dosed, 1.8 ml 0.75% Bupivacaine (in 8.25% dextrose), 10 mcg Fentanyl & 100 mcg Duramorph intrathecal, needle out, to supine with ANNY   Medications:    Medications: bupivacaine (pf) (MARCAINE) injection 0.75% - Intraspinal   1.8 mL - 1/17/2024 3:40:00 PM

## 2024-01-17 NOTE — H&P
"   HISTORY AND PHYSICAL                                                OBSTETRICS          Subjective:      Nevin Liz is a 39 y.o.  female with IUP at 37w2d weeks gestation who presents to L&D for repeat  section. Pertinent medical history for this pregnancy includes previous c/s x1, AMA, Breech, Anti-E antibody followed by [facility] and [facility].  Care this pregnancy has been with Dr. Campos    PMHx:   Past Medical History:   Diagnosis Date    Anxiety     Uterine fibroid        PSHx:   Past Surgical History:   Procedure Laterality Date     SECTION      CYSTOSCOPY      HERNIA REPAIR      LOOP ELECTROSURGICAL EXCISION PROCEDURE (LEEP)      WISDOM TOOTH EXTRACTION         All:   Review of patient's allergies indicates:   Allergen Reactions    Adhesive     Gluten Hives    Penicillins      Rash on chest        Meds:   Medications Prior to Admission   Medication Sig Dispense Refill Last Dose    EScitalopram oxalate (LEXAPRO) 5 MG Tab TAKE 1 TABLET(5 MG) BY MOUTH EVERY DAY 90 tablet 3        SH:   Social History     Socioeconomic History    Marital status:      Spouse name: Jose   Occupational History    Occupation:    Tobacco Use    Smoking status: Never    Smokeless tobacco: Never   Substance and Sexual Activity    Alcohol use: Not Currently    Drug use: Never    Sexual activity: Yes       FH: History reviewed. No pertinent family history.    OBHx:   OB History    Para Term  AB Living   2 1 1 0 0 1   SAB IAB Ectopic Multiple Live Births   0 0 0 0 1      # Outcome Date GA Lbr Monico/2nd Weight Sex Delivery Anes PTL Lv   2 Current            1 Term 14 39w0d  3.033 kg (6 lb 11 oz) F CS-LTranv   MARY JO      Birth Comments: breech       Objective:      BP (!) 141/93 (BP Location: Right arm, Patient Position: Sitting)   Pulse (!) 118   Temp 97.9 °F (36.6 °C) (Oral)   Resp 18   Ht 5' 6" (1.676 m)   Wt 77.1 kg (170 lb)   LMP 2023   Breastfeeding No   BMI 27.44 kg/m² "   Body mass index is 27.44 kg/m².    General:   alert and cooperative   HEENT:  normocephalic, atraumatic   Lungs:   clear to auscultation bilaterally   Heart:   regular rate and rhythm, S1, S2 normal   Abdomen:  gravid, non-tender   Extremities non-tender, no edema   Derm: no rashes or lesions   Psych: appropriate mood and affect   FHT: Reassuring per nursing staff       Assessment:     39 y.o.  at 37w2d weeks gestation here for repeat  delivery  2. H/o  delivery x 1     Plan:        1. Risks, benefits, alternatives and possible complications have been discussed in detail with the patient. All questions have been answered, and Ms. Liz has voiced understanding and agrees to the treatment plan.  2. Consents signed and in chart  3. Admit to Labor and Delivery unit for repeat  delivery  4. Antibiotics per protocol and SCDs for prophylaxis  5. Will monitor BP postpartum

## 2024-01-17 NOTE — L&D DELIVERY NOTE
Pre-op Diagnosis:   1. Intrauterine Pregnancy at 37 WGA  2. Breech presentation  3. GHTN  4. Previous c/s x 1  5. AMA  6. Uterine Fibroids  7. Septate uterus    Postop Diagnosis: Same  Procedure: Repeat Low Transverse  Section via Pfannenstiel incision  Surgeon: Kathleen Edwards MD  Anesthesia: Spinal  Complications: None    QBL: per RN  Findings: Uterus with multiple pedunculated and subserosal fibroids anteriorly and fundally, Uterine septum palpated within endometrial cavity.  Placenta implanted in right uterine horn. normal tubes and ovaries.  Viable male infant weighing 6lbs 6oz  Specimen: Placenta    Indication and Consent: Patient presented to L&D scheduled  delivery. I discussed risks, benefits and options with her in detail. The patient understood that the risks of  section include, but are not limited to, visceral or vascular injury, infection, blood loss and the need for transfusion, prolonged hospitalization and reoperation.  The patient stated understanding and desired to proceed.  All questions were answered.     Procedure: She was taken to the operating room where spinal anesthesia was found to be adequate.  Gent/Clinda were given for infection prophylaxis.  She was prepped and draped in the dorsal supine position with a leftward tilt.  A Pfannenstiel skin incision was made with the scalpel and carried down to the fascia with the Bovie.  The fascia was incised and extended laterally with the Bovie.  The superior aspect of the fascia was grasped with the Kocher clamps.  The underlying rectus muscle was dissected off sharply with Souza scissors.  In a similar fashion, the inferior aspect of the fascia was elevated with the Kocher clamps and the rectus and pyramidalis muscles were dissected off.  Hemostasis was achieved with the Bovie.  The rectus muscle was  in the midline down to the level of the pubic symphysis.  Preperitoneal fatty tissue was bluntly dissected to expose  the peritoneum.  The peritoneum was found to be free of adherent bowel and entered sharply with scissors.  The peritoneal incision was extended superiorly and inferiorly to the bladder reflection with good visualization of the bladder.  The Yoandy retractor was placed. The vesicouterine peritoneum identified, then opened with scissors and the bladder flap was developed.   The lower uterine segment was incised with a scalpel.  The amniotic sac was ruptured and clear fluid was noted.  The uterine incision was extended bluntly with lateral and upward traction.  The fetus was in major breech presentation. The infant delivered to the level of axilla.  Each arm was delivered without difficulty, followed by the head. The mouth and nose were suctioned with bulb suction.  The cord was clamped and cut.  The infant was handed off to waiting nursing and respiratory personnel.  IV Pitocin was initiated to facilitate uterine contractions.  The placenta was delivered intact with manual massage of the uterine fundus.  The inside of the uterus was gently wiped with a lap sponge to assure complete removal of placental membranes.  The uterine incision was closed with a 0 Vicryl suture in a running locked fashion.  Blood clots and fluid were wiped out of the abdomen and pelvis with moist lap sponges.  The uterine incision was reinspected and good hemostasis was noted.   The Yoandy retractor was removed.   The peritoneum was closed in a running fashion. The rectus muscles were loosely reapproximated.  The fascia was closed with 1-0 Vicryl in a continuous running fashion.  The subcuticular tissue was irrigated with warm normal saline.  Subcuticular tissue was reapproximated with plain gut.   Skin was closed using Monocryl in a subcuticular fashion.  The patient tolerated the procedure well.  All sponge and lap counts were correct times 2.  The patient was taken to the recovery room in stable condition.

## 2024-01-17 NOTE — ANESTHESIA PREPROCEDURE EVALUATION
2024  Nevin Liz is a 39 y.o., female.  OB History    Para Term  AB Living   2 1 1     1   SAB IAB Ectopic Multiple Live Births           1      # Outcome Date GA Lbr Monico/2nd Weight Sex Delivery Anes PTL Lv   2 Current            1 Term 14 39w0d  3.033 kg (6 lb 11 oz) F CS-LTranv   MARY JO      Birth Comments: breech       Nevin Liz    Pre-op Diagnosis: Previous  section [Z98.891]    Procedure(s):   SECTION     Review of patient's allergies indicates:   Allergen Reactions    Adhesive     Gluten Hives    Penicillins      Rash on chest        Current Outpatient Medications   Medication Instructions    EScitalopram oxalate (LEXAPRO) 5 mg, Oral       MO  DELIVERY+POSTPARTUM CARE [67512] ( SECT*    Past Medical History:   Diagnosis Date    Anxiety     Uterine fibroid        Past Surgical History:   Procedure Laterality Date     SECTION      CYSTOSCOPY      HERNIA REPAIR      LOOP ELECTROSURGICAL EXCISION PROCEDURE (LEEP)      WISDOM TOOTH EXTRACTION       Recent Labs   Lab 24  1443   WBC 12.31*   RBC 4.13*   HGB 10.5*   HCT 33.4*   MCV 80.9   MCH 25.4*   MCHC 31.4*   RDW 15.2   *   MPV 9.6         Pre-op Assessment    I have reviewed the Patient Summary Reports.    I have reviewed the NPO Status.   I have reviewed the Medications.     Review of Systems  Anesthesia Hx:  No problems with previous Anesthesia             Denies Family Hx of Anesthesia complications.   Personal Hx of Anesthesia complications (difficult spinal last csection 6 to8 sticks)                    Social:  Non-Smoker       Cardiovascular:  Exercise tolerance: good          Denies Angina.   Denies Orthopnea.  Denies PND.    Denies ALFARO.    Functional Capacity good / => 4 METS                         Musculoskeletal:  Musculoskeletal Normal                 Neurological:  Denies TIA.  Denies CVA.                                    Psych:   anxiety                 Physical Exam  General: Well nourished, Alert and Oriented    Airway:  Mallampati: III   Mouth Opening: Normal  TM Distance: Normal  Tongue: Normal  Neck ROM: Normal ROM    Dental:  Intact    Chest/Lungs:  Clear to auscultation    Heart:  Rate: Normal  Rhythm: Regular Rhythm        Anesthesia Plan  Type of Anesthesia, risks & benefits discussed:    Anesthesia Type: Spinal  Intra-op Monitoring Plan: Standard ASA Monitors  Post Op Pain Control Plan: multimodal analgesia  Induction:  IV  Airway Plan: Direct  Informed Consent: Informed consent signed with the Patient and all parties understand the risks and agree with anesthesia plan.  All questions answered. Patient consented to blood products? Yes  ASA Score: 2  Day of Surgery Review of History & Physical: H&P Update referred to the surgeon/provider.  Anesthesia Plan Notes: Risks post spinal headache, backache, high spinal, sensory / motor neuropathy, & failed spinal with possible General Anesthesia (GETA) explained & patient accepts     Ready For Surgery From Anesthesia Perspective.     .

## 2024-01-18 LAB
BASOPHILS # BLD AUTO: 0.04 X10(3)/MCL
BASOPHILS NFR BLD AUTO: 0.2 %
EOSINOPHIL # BLD AUTO: 0.08 X10(3)/MCL (ref 0–0.9)
EOSINOPHIL NFR BLD AUTO: 0.5 %
ERYTHROCYTE [DISTWIDTH] IN BLOOD BY AUTOMATED COUNT: 15.2 % (ref 11.5–17)
HCT VFR BLD AUTO: 27.4 % (ref 37–47)
HGB BLD-MCNC: 8.6 G/DL (ref 12–16)
IMM GRANULOCYTES # BLD AUTO: 0.11 X10(3)/MCL (ref 0–0.04)
IMM GRANULOCYTES NFR BLD AUTO: 0.6 %
LYMPHOCYTES # BLD AUTO: 1.1 X10(3)/MCL (ref 0.6–4.6)
LYMPHOCYTES NFR BLD AUTO: 6.4 %
MCH RBC QN AUTO: 25.7 PG (ref 27–31)
MCHC RBC AUTO-ENTMCNC: 31.4 G/DL (ref 33–36)
MCV RBC AUTO: 82 FL (ref 80–94)
MONOCYTES # BLD AUTO: 1.43 X10(3)/MCL (ref 0.1–1.3)
MONOCYTES NFR BLD AUTO: 8.3 %
NEUTROPHILS # BLD AUTO: 14.43 X10(3)/MCL (ref 2.1–9.2)
NEUTROPHILS NFR BLD AUTO: 84 %
NRBC BLD AUTO-RTO: 0 %
PLATELET # BLD AUTO: 339 X10(3)/MCL (ref 130–400)
PMV BLD AUTO: 10.1 FL (ref 7.4–10.4)
RBC # BLD AUTO: 3.34 X10(6)/MCL (ref 4.2–5.4)
WBC # SPEC AUTO: 17.19 X10(3)/MCL (ref 4.5–11.5)

## 2024-01-18 PROCEDURE — 11000001 HC ACUTE MED/SURG PRIVATE ROOM

## 2024-01-18 PROCEDURE — 25000003 PHARM REV CODE 250: Performed by: OBSTETRICS & GYNECOLOGY

## 2024-01-18 PROCEDURE — 63600175 PHARM REV CODE 636 W HCPCS: Performed by: OBSTETRICS & GYNECOLOGY

## 2024-01-18 PROCEDURE — 85025 COMPLETE CBC W/AUTO DIFF WBC: CPT | Performed by: OBSTETRICS & GYNECOLOGY

## 2024-01-18 PROCEDURE — 25000003 PHARM REV CODE 250: Performed by: ANESTHESIOLOGY

## 2024-01-18 RX ORDER — ACETAMINOPHEN 325 MG/1
650 TABLET ORAL EVERY 6 HOURS PRN
Status: DISCONTINUED | OUTPATIENT
Start: 2024-01-18 | End: 2024-01-19 | Stop reason: HOSPADM

## 2024-01-18 RX ORDER — IBUPROFEN 800 MG/1
800 TABLET ORAL EVERY 8 HOURS
Status: DISCONTINUED | OUTPATIENT
Start: 2024-01-18 | End: 2024-01-19 | Stop reason: HOSPADM

## 2024-01-18 RX ADMIN — DOCUSATE SODIUM 200 MG: 100 CAPSULE, LIQUID FILLED ORAL at 09:01

## 2024-01-18 RX ADMIN — ACETAMINOPHEN 325MG 650 MG: 325 TABLET ORAL at 08:01

## 2024-01-18 RX ADMIN — KETOROLAC TROMETHAMINE 30 MG: 30 INJECTION, SOLUTION INTRAMUSCULAR; INTRAVENOUS at 06:01

## 2024-01-18 RX ADMIN — IBUPROFEN 800 MG: 800 TABLET, FILM COATED ORAL at 02:01

## 2024-01-18 RX ADMIN — SIMETHICONE 80 MG: 80 TABLET, CHEWABLE ORAL at 12:01

## 2024-01-18 RX ADMIN — ACETAMINOPHEN 325MG 650 MG: 325 TABLET ORAL at 12:01

## 2024-01-18 RX ADMIN — OXYCODONE HYDROCHLORIDE AND ACETAMINOPHEN 1 TABLET: 5; 325 TABLET ORAL at 10:01

## 2024-01-18 RX ADMIN — DOCUSATE SODIUM 200 MG: 100 CAPSULE, LIQUID FILLED ORAL at 12:01

## 2024-01-18 RX ADMIN — HYDROMORPHONE HYDROCHLORIDE 1 MG: 2 INJECTION INTRAMUSCULAR; INTRAVENOUS; SUBCUTANEOUS at 01:01

## 2024-01-18 RX ADMIN — IBUPROFEN 800 MG: 800 TABLET, FILM COATED ORAL at 09:01

## 2024-01-18 RX ADMIN — ACETAMINOPHEN 650 MG: 325 TABLET, FILM COATED ORAL at 05:01

## 2024-01-18 NOTE — PROGRESS NOTES
PostPartum Progress Note        Subjective:      Post-Operative Day #1 after  delivery secondary to previous c/section/ breech presentation/ GHTN .    Patient is without complaints. Lochia less than menses. Bottle feeding. Baby in NICU. Pain is well controlled. Patient is ambulating. Tolerating advancing diet.Overall mother and baby are doing well.     Objective:      Temp:  [97.5 °F (36.4 °C)-97.9 °F (36.6 °C)] 97.9 °F (36.6 °C)  Pulse:  [] 99  Resp:  [16-18] 16  SpO2:  [97 %-99 %] 99 %  BP: (110-141)/(64-93) 129/79    Intake/Output Summary (Last 24 hours) at 2024 0834  Last data filed at 2024 0000  Gross per 24 hour   Intake 750 ml   Output 1570 ml   Net -820 ml     Body mass index is 27.44 kg/m².    General: no acute distress  Abdomen: soft, non-tender, non-distended; Fundus firm and at the umbilicus  LTCS incision covered with pressure dressing, D&I  Extremities: non-tender, symmetric, trace edema    Group & Rh   Date Value Ref Range Status   2024 O POS  Final     Recent Results (from the past 336 hour(s))   CBC with Differential    Collection Time: 24  3:45 AM   Result Value Ref Range    WBC 17.19 (H) 4.50 - 11.50 x10(3)/mcL    Hgb 8.6 (L) 12.0 - 16.0 g/dL    Hct 27.4 (L) 37.0 - 47.0 %    Platelet 339 130 - 400 x10(3)/mcL   CBC with Differential    Collection Time: 24  2:43 PM   Result Value Ref Range    WBC 12.31 (H) 4.50 - 11.50 x10(3)/mcL    Hgb 10.5 (L) 12.0 - 16.0 g/dL    Hct 33.4 (L) 37.0 - 47.0 %    Platelet 410 (H) 130 - 400 x10(3)/mcL          Assessment:     39 y.o.  S/P  Delivery Post-Operative Day #1  - Doing Well      Plan:     1. Continue routine postpartum care  2. Plan for D/C  Saturday or  depending on baby status

## 2024-01-18 NOTE — ANESTHESIA POSTPROCEDURE EVALUATION
Anesthesia Post Evaluation    Patient: Nevin Liz    Procedure(s) Performed: Procedure(s) (LRB):   SECTION (N/A)    Final Anesthesia Type: spinal      Patient location during evaluation: floor  Patient participation: Yes- Able to Participate  Level of consciousness: awake and alert and oriented  Post-procedure vital signs: reviewed and stable  Pain management: adequate  Airway patency: patent    PONV status at discharge: No PONV  Anesthetic complications: no      Cardiovascular status: blood pressure returned to baseline  Respiratory status: unassisted, spontaneous ventilation and room air  Hydration status: euvolemic  Follow-up not needed.  Comments: Denies headache, mod-severe backpain, or lower extremity motor weekness              Vitals Value Taken Time   /79 24 0735   Temp 36.6 °C (97.9 °F) 24 0735   Pulse 99 24 0735   Resp 16 24 0108   SpO2 99 % 24 1710         Event Time   Out of Recovery 17:25:00         Pain/Ashkan Score: Pain Rating Prior to Med Admin: 2 (2024  8:22 AM)  Ashkan Score: 9 (2024  5:25 PM)

## 2024-01-19 VITALS
BODY MASS INDEX: 27.32 KG/M2 | HEART RATE: 103 BPM | HEIGHT: 66 IN | OXYGEN SATURATION: 99 % | SYSTOLIC BLOOD PRESSURE: 117 MMHG | RESPIRATION RATE: 20 BRPM | WEIGHT: 170 LBS | DIASTOLIC BLOOD PRESSURE: 78 MMHG | TEMPERATURE: 99 F

## 2024-01-19 PROCEDURE — 25000003 PHARM REV CODE 250: Performed by: OBSTETRICS & GYNECOLOGY

## 2024-01-19 RX ADMIN — IBUPROFEN 800 MG: 800 TABLET, FILM COATED ORAL at 06:01

## 2024-01-19 RX ADMIN — DOCUSATE SODIUM 200 MG: 100 CAPSULE, LIQUID FILLED ORAL at 09:01

## 2024-01-19 RX ADMIN — PRENATAL VITAMINS-IRON FUMARATE 27 MG IRON-FOLIC ACID 0.8 MG TABLET 1 TABLET: at 09:01

## 2024-01-19 RX ADMIN — SIMETHICONE 80 MG: 80 TABLET, CHEWABLE ORAL at 09:01

## 2024-01-19 NOTE — DISCHARGE SUMMARY
Delivery Discharge Summary  Obstetrics      Primary OB Clinician: Meenu Campos MD    Discharge Provider: Nestor Solorio MD    Admission date: 2024  Discharge date: 2024    Admit Dx:   Discharge Dx:    Patient Active Problem List   Diagnosis    Anxiety    1. 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 AMA multigravida, third trimester    3. Anomaly of pregnant uterus, third trimester    4. History of loop electrosurgical excision procedure (LEEP) of cervix affecting pregnancy, antepartum, third trimester    5. Uterine fibroids affecting pregnancy, third trimester    6. Anxiety affecting pregnancy in third trimester    7. Pyelectasis of fetus on prenatal ultrasound    8. Short lower extremities, fetal, affecting care of mother, antepartum    Gestational hypertension    Previous  section    Breech presentation     delivery delivered       Procedure: , due to previous c/section/ breech/ GHTN    [facility] Course:  Nevin Liz is a 39 y.o. now  who was admitted on 2024 for delivery. Patient delivered a viable . Please see delivery note for further details. Pt was in stable condition post delivery and was transferred to the Mother-Baby Unit. Her postpartum course was uncomplicated. On the date of discharge, patient's pain is controlled with oral pain medications. She is tolerating ambulation without SOB or CP, and PO diet without N/V. Reported lochia is within the normal range. Pt in stable condition and ready for discharge.     Pertinent studies:  Postpartum CBC  Lab Results   Component Value Date    WBC 17.19 (H) 2024    HGB 8.6 (L) 2024    HCT 27.4 (L) 2024    MCV 82.0 2024     2024       Delivery:    Episiotomy: None   Lacerations: None   Repair suture: None   Repair # of packets:     Blood loss (ml):       Birth information:  YOB: 2024   Time of birth: 3:54 PM   Sex: male   Delivery  "type: , Low Transverse   Gestational Age: 37w2d     Measurements    Weight: 2900 g  Weight (lbs): 6 lb 6.3 oz  Length: 50.5 cm  Length (in): 19.88"         Delivery Clinician: Delivery Providers    Delivering clinician: Kathleen Edwards MD   Provider Role    Billie Tse, RN Registered Nurse    Selin Holguin Scrub Person    Char Correa RN Registered Nurse    Miri Walker, RICKI Respiratory Therapist    Tangela Major, CRNA Nurse Anesthetist    Js Cuba MD Anesthesiologist    Kenya Blue RN Registered Nurse    Jed Tse RN Registered Nurse             Additional  information:  Forceps:    Vacuum:    Breech:    Observed anomalies      Living?:     Apgars    Living status: Living  Apgar Component Scores:  1 min.:  5 min.:  10 min.:  15 min.:  20 min.:    Skin color:  0  0  1      Heart rate:  2  2  2      Reflex irritability:  2  2  2      Muscle tone:  1  1  1      Respiratory effort:  1  2  2      Total:  6  7  8      Apgars assigned by: POLINA CORREA RN         Placenta: Delivered:       appearance    Disposition: To home, self care    Follow Up: 2 weeks    Patient Instructions:   1. Call the office for any bleeding >2 pads/hour for >2 hours, temperature >100.4, pain that is uncontrolled with medications, or for any other concerns.  2. Pelvic rest and no tub baths x 6 weeks.  3. No driving while on narcotics.    Current Discharge Medication List        CONTINUE these medications which have NOT CHANGED    Details   EScitalopram oxalate (LEXAPRO) 5 MG Tab TAKE 1 TABLET(5 MG) BY MOUTH EVERY DAY  Qty: 90 tablet, Refills: 3    Associated Diagnoses: Anxiety             Nestor Solorio   "

## 2024-02-07 ENCOUNTER — PATIENT MESSAGE (OUTPATIENT)
Dept: MATERNAL FETAL MEDICINE | Facility: CLINIC | Age: 39
End: 2024-02-07
Payer: COMMERCIAL

## 2024-03-11 PROBLEM — O36.0930 ANTI-E ISOIMMUNIZATION AFFECTING PREGNANCY IN THIRD TRIMESTER: Status: RESOLVED | Noted: 2023-09-14 | Resolved: 2024-03-11

## 2024-06-27 ENCOUNTER — OFFICE VISIT (OUTPATIENT)
Dept: FAMILY MEDICINE | Facility: CLINIC | Age: 39
End: 2024-06-27
Payer: COMMERCIAL

## 2024-06-27 VITALS
HEIGHT: 66 IN | DIASTOLIC BLOOD PRESSURE: 78 MMHG | HEART RATE: 90 BPM | WEIGHT: 160 LBS | SYSTOLIC BLOOD PRESSURE: 117 MMHG | OXYGEN SATURATION: 99 % | BODY MASS INDEX: 25.71 KG/M2 | RESPIRATION RATE: 18 BRPM | TEMPERATURE: 99 F

## 2024-06-27 DIAGNOSIS — Z83.2 FAMILY HISTORY OF AUTOIMMUNE DISORDER: ICD-10-CM

## 2024-06-27 DIAGNOSIS — Z87.59 HISTORY OF GESTATIONAL HYPERTENSION: ICD-10-CM

## 2024-06-27 DIAGNOSIS — F41.9 ANXIETY: ICD-10-CM

## 2024-06-27 DIAGNOSIS — Z00.00 ENCOUNTER FOR PREVENTATIVE ADULT HEALTH CARE EXAMINATION: Primary | ICD-10-CM

## 2024-06-27 DIAGNOSIS — Z87.2: ICD-10-CM

## 2024-06-27 PROBLEM — O99.343 MENTAL DISORDER AFFECTING PREGNANCY IN THIRD TRIMESTER: Status: RESOLVED | Noted: 2023-09-14 | Resolved: 2024-06-27

## 2024-06-27 PROCEDURE — 3074F SYST BP LT 130 MM HG: CPT | Mod: CPTII,,, | Performed by: FAMILY MEDICINE

## 2024-06-27 PROCEDURE — 99395 PREV VISIT EST AGE 18-39: CPT | Mod: ,,, | Performed by: FAMILY MEDICINE

## 2024-06-27 PROCEDURE — 1160F RVW MEDS BY RX/DR IN RCRD: CPT | Mod: CPTII,,, | Performed by: FAMILY MEDICINE

## 2024-06-27 PROCEDURE — 3008F BODY MASS INDEX DOCD: CPT | Mod: CPTII,,, | Performed by: FAMILY MEDICINE

## 2024-06-27 PROCEDURE — 3078F DIAST BP <80 MM HG: CPT | Mod: CPTII,,, | Performed by: FAMILY MEDICINE

## 2024-06-27 PROCEDURE — 1159F MED LIST DOCD IN RCRD: CPT | Mod: CPTII,,, | Performed by: FAMILY MEDICINE

## 2024-06-27 RX ORDER — ESCITALOPRAM OXALATE 10 MG/1
10 TABLET ORAL DAILY
Qty: 30 TABLET | Refills: 3 | Status: SHIPPED | OUTPATIENT
Start: 2024-06-27

## 2024-06-27 NOTE — PROGRESS NOTES
Patient ID: [id number]     Chief Complaint: Annual Exam (Wellness )        HPI:   Disclaimer:  This note is prepared using voice recognition software and as such is likely to have errors despite attempts at proofreading. Please contact me for questions.     Nevin Liz is a 39 y.o. female here today for an annual wellness visit.  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 Admits to overall healthy diet and avoids unhealthy snacking.   Exercise: Walks for exercise during kids soccer practices.  Gestational HTN - BP now WNL, family hx of HTN, and patient admits to CP during times of heightened anxiety. She admits to improvement in anxiety with Lexapro however room for improvement.  Cervical Cancer Screening - Last Pap smear 2024, with Dr. Walsh  Breast Cancer Screening - Has hx of lump that was biopsied with Dr. Luu records requested, aunt with breast cancer.  Colon Cancer Screening - Due at age 45.  Osteoporosis Screening - Not yet due  Eye Exam - Last eye exam 2 years ago. Patient will call for appointment.  Dental Exam - Last dental exam within last 6 months per patient.  Vaccinations -   Immunization History   Administered Date(s) Administered    Influenza - Quadrivalent - PF *Preferred* (6 months and older) 2019    Influenza - Trivalent - PF (ADULT) 10/27/2020        Past Medical History:   Diagnosis Date    Anxiety     Uterine fibroid         Past Surgical History:   Procedure Laterality Date     SECTION       SECTION N/A 2024    Procedure:  SECTION;  Surgeon: Kathleen Edwards MD;  Location: [facility];  Service: OB/GYN;  Laterality: N/A;    CYSTOSCOPY      HERNIA REPAIR      LOOP ELECTROSURGICAL EXCISION PROCEDURE (LEEP)      WISDOM TOOTH EXTRACTION         Review of patient's allergies indicates:   Allergen Reactions    Adhesive     Gluten Hives    Penicillins      Rash on chest        Outpatient Medications Marked as Taking for the 24 encounter  "(Office Visit) with Marques Jean-Baptiste MD   Medication Sig Dispense Refill    [DISCONTINUED] EScitalopram oxalate (LEXAPRO) 5 MG Tab TAKE 1 TABLET(5 MG) BY MOUTH EVERY DAY 90 tablet 3       Social History     Socioeconomic History    Marital status:      Spouse name: Jose   Occupational History    Occupation:    Tobacco Use    Smoking status: Never    Smokeless tobacco: Never   Substance and Sexual Activity    Alcohol use: Not Currently    Drug use: Never    Sexual activity: Yes        Subjective:     Review of Systems:   Review of Systems   Constitutional:  Negative for chills, diaphoresis and fever.   Eyes:  Negative for blurred vision and double vision.   Respiratory:  Negative for cough and shortness of breath.    Cardiovascular:  Negative for chest pain and leg swelling.   Gastrointestinal:  Negative for abdominal pain, diarrhea, nausea and vomiting.   Skin:  Negative for rash.   Neurological:  Negative for dizziness, tremors and headaches.        See HPI for details    Objective:     /78 (BP Location: Right arm, Patient Position: Sitting)   Pulse 90   Temp 98.8 °F (37.1 °C) (Oral)   Resp 18   Ht 5' 6" (1.676 m)   Wt 72.6 kg (160 lb)   SpO2 99%   Breastfeeding No Comment: IUD Patient reorts not having cycles  BMI 25.82 kg/m²     Physical Exam  Constitutional:       General: She is not in acute distress.     Appearance: She is not toxic-appearing or diaphoretic.   HENT:      Head: Normocephalic and atraumatic.      Right Ear: Tympanic membrane, ear canal and external ear normal. There is no impacted cerumen.      Left Ear: Tympanic membrane, ear canal and external ear normal. There is no impacted cerumen.      Nose: Nose normal.      Mouth/Throat:      Mouth: Mucous membranes are moist.      Pharynx: Oropharynx is clear. No oropharyngeal exudate or posterior oropharyngeal erythema.   Eyes:      General: No scleral icterus.     Extraocular Movements: Extraocular movements intact. "      Conjunctiva/sclera: Conjunctivae normal.   Cardiovascular:      Rate and Rhythm: Normal rate and regular rhythm.      Heart sounds: Normal heart sounds. No murmur heard.     No friction rub. No gallop.   Pulmonary:      Effort: Pulmonary effort is normal. No respiratory distress.      Breath sounds: Normal breath sounds. No stridor. No wheezing, rhonchi or rales.   Musculoskeletal:         General: Normal range of motion.      Cervical back: Normal range of motion and neck supple. No rigidity.   Lymphadenopathy:      Cervical: No cervical adenopathy.   Skin:     General: Skin is warm and dry.      Coloration: Skin is not pale.   Neurological:      General: No focal deficit present.      Mental Status: She is alert and oriented to person, place, and time. Mental status is at baseline.   Psychiatric:         Mood and Affect: Mood normal.         Behavior: Behavior normal.         Thought Content: Thought content normal.         Judgment: Judgment normal.         Assessment:       ICD-10-CM ICD-9-CM   1. Encounter for preventative adult health care examination  Z00.00 V70.0   2. Anxiety  F41.9 300.00   3. History of gestational hypertension  Z87.59 V13.29   4. Family history of autoimmune disorder  Z83.2 V19.8   5. H/O herpetiformis dermatitis  Z87.2 V13.3        Plan:       Health Maintenance Topics with due status: Not Due       Topic Last Completion Date    Hemoglobin A1c (Diabetic Prevention Screening) 09/24/2021    TETANUS VACCINE 11/15/2023    Influenza Vaccine 11/15/2023        1. Encounter for preventative adult health care examination  - CBC Auto Differential; Future  - Comprehensive Metabolic Panel; Future  - Hemoglobin A1C; Future  - Lipid Panel; Future  - TSH; Future  - Urinalysis, Reflex to Urine Culture; Future  Recommend annual eye exam and biannual dental exams.  Limit caffeine and alcohol intake.  Well balanced diet low in sugar/complex carbohydrates and increased vegetable intake  encouraged.  Moderate intensity exercise 30 min/day at least 5 days/Wk (total 150 min/Wk) recommended.  Maintain healthy BMI which may include weight loss.  Wellness labs ordered.  See above preventative health screening at today's visit.    2. Anxiety  - EScitalopram oxalate (LEXAPRO) 10 MG tablet; Take 1 tablet (10 mg total) by mouth once daily.  Dispense: 30 tablet; Refill: 3  - TSH; Future  Trial of Lexapro 10 mg daily.  Denies SI/HI, AH/VH.  Recommend relaxation techniques and coping strategies (i.e. essential oils, deep breathing, exercise, etc).  Seek immediate medical treatment for SOB, persistent panic attack, chest pain, suicidal thoughts or hallucinations.    3. History of gestational hypertension  BP at goal.  Low sodium diet and exercise recommended  Limit caffeine intake.  Seek immediate medical treatment for chest pain, SOB, LE edema, severe headache, blurred vision, dizziness, slurred speech, any new or worsening symptoms.    4. Family history of autoimmune disorder  - JHONATAN IgG by IFA; Future  - Rheumatoid Factors, IgA, IgG, IgM; Future    5. H/O herpetiformis dermatitis  - JHONATAN IgG by IFA; Future      Follow up in about 1 year (around 6/27/2025) for Wellness Visit.

## 2024-07-01 PROBLEM — O09.523 ELDERLY MULTIGRAVIDA, THIRD TRIMESTER: Status: RESOLVED | Noted: 2023-09-14 | Resolved: 2024-07-01

## 2024-07-01 PROBLEM — O35.HXX0 SHORT LOWER EXTREMITIES, FETAL, AFFECTING CARE OF MOTHER, ANTEPARTUM: Status: RESOLVED | Noted: 2023-12-07 | Resolved: 2024-07-01

## 2024-07-01 PROBLEM — O34.593: Status: RESOLVED | Noted: 2023-09-14 | Resolved: 2024-07-01

## 2024-07-02 ENCOUNTER — LAB VISIT (OUTPATIENT)
Dept: LAB | Facility: HOSPITAL | Age: 39
End: 2024-07-02
Attending: FAMILY MEDICINE
Payer: COMMERCIAL

## 2024-07-02 DIAGNOSIS — Z83.2 FAMILY HISTORY OF AUTOIMMUNE DISORDER: ICD-10-CM

## 2024-07-02 DIAGNOSIS — Z00.00 ENCOUNTER FOR PREVENTATIVE ADULT HEALTH CARE EXAMINATION: ICD-10-CM

## 2024-07-02 DIAGNOSIS — F41.9 ANXIETY: ICD-10-CM

## 2024-07-02 DIAGNOSIS — Z87.2: ICD-10-CM

## 2024-07-02 LAB
ALBUMIN SERPL-MCNC: 4.3 G/DL (ref 3.5–5)
ALBUMIN/GLOB SERPL: 1.3 RATIO (ref 1.1–2)
ALP SERPL-CCNC: 64 UNIT/L (ref 40–150)
ALT SERPL-CCNC: 17 UNIT/L (ref 0–55)
ANION GAP SERPL CALC-SCNC: 7 MEQ/L
AST SERPL-CCNC: 17 UNIT/L (ref 5–34)
BASOPHILS # BLD AUTO: 0.04 X10(3)/MCL
BASOPHILS NFR BLD AUTO: 0.8 %
BILIRUB SERPL-MCNC: 0.4 MG/DL
BILIRUB UR QL STRIP.AUTO: NEGATIVE
BUN SERPL-MCNC: 14.6 MG/DL (ref 7–18.7)
CALCIUM SERPL-MCNC: 9.3 MG/DL (ref 8.4–10.2)
CHLORIDE SERPL-SCNC: 109 MMOL/L (ref 98–107)
CHOLEST SERPL-MCNC: 183 MG/DL
CHOLEST/HDLC SERPL: 3 {RATIO} (ref 0–5)
CLARITY UR: CLEAR
CO2 SERPL-SCNC: 23 MMOL/L (ref 22–29)
COLOR UR AUTO: YELLOW
CREAT SERPL-MCNC: 0.82 MG/DL (ref 0.55–1.02)
CREAT/UREA NIT SERPL: 18
EOSINOPHIL # BLD AUTO: 0.16 X10(3)/MCL (ref 0–0.9)
EOSINOPHIL NFR BLD AUTO: 3.2 %
ERYTHROCYTE [DISTWIDTH] IN BLOOD BY AUTOMATED COUNT: 15.3 % (ref 11.5–17)
EST. AVERAGE GLUCOSE BLD GHB EST-MCNC: 99.7 MG/DL
GFR SERPLBLD CREATININE-BSD FMLA CKD-EPI: >60 ML/MIN/1.73/M2
GLOBULIN SER-MCNC: 3.4 GM/DL (ref 2.4–3.5)
GLUCOSE SERPL-MCNC: 93 MG/DL (ref 74–100)
GLUCOSE UR QL STRIP: NEGATIVE
HBA1C MFR BLD: 5.1 %
HCT VFR BLD AUTO: 40.6 % (ref 37–47)
HDLC SERPL-MCNC: 59 MG/DL (ref 35–60)
HGB BLD-MCNC: 13.4 G/DL (ref 12–16)
HGB UR QL STRIP: NEGATIVE
IMM GRANULOCYTES # BLD AUTO: 0 X10(3)/MCL (ref 0–0.04)
IMM GRANULOCYTES NFR BLD AUTO: 0 %
KETONES UR QL STRIP: NEGATIVE
LDLC SERPL CALC-MCNC: 112 MG/DL (ref 50–140)
LEUKOCYTE ESTERASE UR QL STRIP: NEGATIVE
LYMPHOCYTES # BLD AUTO: 1.6 X10(3)/MCL (ref 0.6–4.6)
LYMPHOCYTES NFR BLD AUTO: 31.7 %
MCH RBC QN AUTO: 28 PG (ref 27–31)
MCHC RBC AUTO-ENTMCNC: 33 G/DL (ref 33–36)
MCV RBC AUTO: 84.9 FL (ref 80–94)
MONOCYTES # BLD AUTO: 0.45 X10(3)/MCL (ref 0.1–1.3)
MONOCYTES NFR BLD AUTO: 8.9 %
NEUTROPHILS # BLD AUTO: 2.79 X10(3)/MCL (ref 2.1–9.2)
NEUTROPHILS NFR BLD AUTO: 55.4 %
NITRITE UR QL STRIP: NEGATIVE
NRBC BLD AUTO-RTO: 0 %
PH UR STRIP: 6 [PH]
PLATELET # BLD AUTO: 267 X10(3)/MCL (ref 130–400)
PMV BLD AUTO: 10.6 FL (ref 7.4–10.4)
POTASSIUM SERPL-SCNC: 4.1 MMOL/L (ref 3.5–5.1)
PROT SERPL-MCNC: 7.7 GM/DL (ref 6.4–8.3)
PROT UR QL STRIP: NEGATIVE
RBC # BLD AUTO: 4.78 X10(6)/MCL (ref 4.2–5.4)
RHEUMATOID FACT SERPL-ACNC: <13 IU/ML
RHEUMATOID FACTOR IGA QUANTITATIVE (OLG): 5.2 IU/ML
RHEUMATOID FACTOR IGM QUANTITATIVE (OLG): 1.6 IU/ML
SODIUM SERPL-SCNC: 139 MMOL/L (ref 136–145)
SP GR UR STRIP.AUTO: 1.02 (ref 1–1.03)
TRIGL SERPL-MCNC: 59 MG/DL (ref 37–140)
TSH SERPL-ACNC: 1.23 UIU/ML (ref 0.35–4.94)
UROBILINOGEN UR STRIP-ACNC: 0.2
VLDLC SERPL CALC-MCNC: 12 MG/DL
WBC # BLD AUTO: 5.04 X10(3)/MCL (ref 4.5–11.5)

## 2024-07-02 PROCEDURE — 83036 HEMOGLOBIN GLYCOSYLATED A1C: CPT

## 2024-07-02 PROCEDURE — 80053 COMPREHEN METABOLIC PANEL: CPT

## 2024-07-02 PROCEDURE — 81003 URINALYSIS AUTO W/O SCOPE: CPT

## 2024-07-02 PROCEDURE — 84443 ASSAY THYROID STIM HORMONE: CPT

## 2024-07-02 PROCEDURE — 86431 RHEUMATOID FACTOR QUANT: CPT

## 2024-07-02 PROCEDURE — 85025 COMPLETE CBC W/AUTO DIFF WBC: CPT

## 2024-07-02 PROCEDURE — 86431 RHEUMATOID FACTOR QUANT: CPT | Mod: 59

## 2024-07-02 PROCEDURE — 86039 ANTINUCLEAR ANTIBODIES (ANA): CPT

## 2024-07-02 PROCEDURE — 80061 LIPID PANEL: CPT

## 2024-07-02 PROCEDURE — 36415 COLL VENOUS BLD VENIPUNCTURE: CPT

## 2024-07-05 LAB
ANA PAT SER IF-IMP: ABNORMAL
ANA SER QL HEP2 SUBST: ABNORMAL
ANA TITR SER HEP2 SUBST: ABNORMAL {TITER}

## 2024-07-05 NOTE — PROGRESS NOTES
Reviewed patient's labs. No acute concerns. Please inform the patient if not already received through patient portal.

## 2024-07-09 DIAGNOSIS — Z83.2 FAMILY HISTORY OF AUTOIMMUNE DISORDER: ICD-10-CM

## 2024-07-09 DIAGNOSIS — R76.8 POSITIVE ANA (ANTINUCLEAR ANTIBODY): Primary | ICD-10-CM

## 2024-07-10 ENCOUNTER — LAB VISIT (OUTPATIENT)
Dept: LAB | Facility: HOSPITAL | Age: 39
End: 2024-07-10
Attending: FAMILY MEDICINE
Payer: COMMERCIAL

## 2024-07-10 DIAGNOSIS — Z83.2 FAMILY HISTORY OF AUTOIMMUNE DISORDER: ICD-10-CM

## 2024-07-10 DIAGNOSIS — R76.8 POSITIVE ANA (ANTINUCLEAR ANTIBODY): ICD-10-CM

## 2024-07-10 PROCEDURE — 86235 NUCLEAR ANTIGEN ANTIBODY: CPT

## 2024-07-10 PROCEDURE — 86225 DNA ANTIBODY NATIVE: CPT

## 2024-07-10 PROCEDURE — 36415 COLL VENOUS BLD VENIPUNCTURE: CPT

## 2024-07-11 LAB
DSDNA AB QUANT (OHS): 2.8 IU/ML
SCL-70S AB QUANT (OHS): 0.7 U/ML
SMITH AB QUANT (OHS): <0.7 U/ML
SSA(RO) AB QUANT (OHS): <0.4 U/ML
SSB(LA) AB QUANT (OHS): <0.4 U/ML

## 2024-07-13 ENCOUNTER — TELEPHONE (OUTPATIENT)
Dept: FAMILY MEDICINE | Facility: CLINIC | Age: 39
End: 2024-07-13
Payer: COMMERCIAL

## 2024-07-13 NOTE — TELEPHONE ENCOUNTER
----- Message from Marques Jean-Baptiste MD sent at 7/12/2024 12:39 PM CDT -----  Autoimmune workup negative.

## 2024-09-27 ENCOUNTER — OFFICE VISIT (OUTPATIENT)
Dept: FAMILY MEDICINE | Facility: CLINIC | Age: 39
End: 2024-09-27
Payer: COMMERCIAL

## 2024-09-27 DIAGNOSIS — F41.9 ANXIETY: ICD-10-CM

## 2024-09-27 PROCEDURE — 1160F RVW MEDS BY RX/DR IN RCRD: CPT | Mod: CPTII,95,, | Performed by: FAMILY MEDICINE

## 2024-09-27 PROCEDURE — 1159F MED LIST DOCD IN RCRD: CPT | Mod: CPTII,95,, | Performed by: FAMILY MEDICINE

## 2024-09-27 PROCEDURE — 99442 PR PHYSICIAN TELEPHONE EVALUATION 11-20 MIN: CPT | Mod: 95,,, | Performed by: FAMILY MEDICINE

## 2024-09-27 PROCEDURE — 3044F HG A1C LEVEL LT 7.0%: CPT | Mod: CPTII,95,, | Performed by: FAMILY MEDICINE

## 2024-09-27 RX ORDER — ESCITALOPRAM OXALATE 10 MG/1
10 TABLET ORAL DAILY
Qty: 30 TABLET | Refills: 11 | Status: SHIPPED | OUTPATIENT
Start: 2024-09-27

## 2024-09-27 NOTE — PROGRESS NOTES
Patient ID: [id number]     Chief Complaint: Anxiety        HPI:   Disclaimer:  This note is prepared using voice recognition software and as such is likely to have errors despite attempts at proofreading. Please contact me for questions.     This is a telemedicine note. Patient was treated using telemedicine, real time audio and video, according to [facility] protocols. Marques KENDALL MD, conducted the visit from the [facility]. The patient participated in the visit at a non-[facility] location selected by the patient, identified below. I am licensed in the state where the patient stated they are located. The patient stated that they understood and accepted the privacy and security risks to their information at their location. This visit is not recorded. The patient is located in her car.      Nevin Liz is a 39 y.o. female who presents via telemedicine visit today.   Patient admits to significant improvement in anxiety with Lexapro 10 mg.  She denies any medication side effects. She would like to continue the medication as prescribed. She denies any acute concerns.      Past Medical History:   Diagnosis Date    Anxiety     Uterine fibroid         Past Surgical History:   Procedure Laterality Date     SECTION       SECTION N/A 2024    Procedure:  SECTION;  Surgeon: Kathleen Edwards MD;  Location: [facility] L[facility];  Service: OB/GYN;  Laterality: N/A;    CYSTOSCOPY      HERNIA REPAIR      LOOP ELECTROSURGICAL EXCISION PROCEDURE (LEEP)      WISDOM TOOTH EXTRACTION         Review of patient's allergies indicates:   Allergen Reactions    Adhesive     Gluten Hives    Penicillins      Rash on chest        No outpatient medications have been marked as taking for the 24 encounter (Office Visit) with Marques Jean-Baptiste MD.       Social History     Socioeconomic History    Marital status:      Spouse name: Jose   Occupational History    Occupation:    Tobacco Use     Smoking status: Never    Smokeless tobacco: Never   Substance and Sexual Activity    Alcohol use: Not Currently    Drug use: Never    Sexual activity: Yes     Social Drivers of Health     Financial Resource Strain: Low Risk  (9/27/2024)    Overall Financial Resource Strain (CARDIA)     Difficulty of Paying Living Expenses: Not hard at all   Food Insecurity: No Food Insecurity (9/27/2024)    Hunger Vital Sign     Worried About Running Out of Food in the Last Year: Never true     Ran Out of Food in the Last Year: Never true   Physical Activity: Insufficiently Active (9/27/2024)    Exercise Vital Sign     Days of Exercise per Week: 2 days     Minutes of Exercise per Session: 40 min   Stress: No Stress Concern Present (9/27/2024)    [employer] [place] [employer] - Occupational Stress Questionnaire     Feeling of Stress : Only a little   Housing Stability: Unknown (9/27/2024)    Housing Stability Vital Sign     Unable to Pay for Housing in the Last Year: No        No family history on file.     Subjective:     Review of Systems   HENT:  Negative for hearing loss.    Eyes:  Negative for discharge.   Respiratory:  Negative for wheezing.    Cardiovascular:  Negative for chest pain and palpitations.   Gastrointestinal:  Negative for blood in stool, constipation, diarrhea and vomiting.   Genitourinary:  Negative for dysuria and hematuria.   Musculoskeletal:  Negative for neck pain.   Neurological:  Negative for weakness and headaches.   Endo/Heme/Allergies:  Negative for polydipsia.      See HPI for details  All Other ROS: Negative except as stated in HPI.       Objective:     There were no vitals taken for this visit.    Physical Exam    Physical Exam: LIMITED DUE TO TELEMEDICINE RESTRICTIONS.  General: Alert and oriented, No acute distress.  Head: Normocephalic, Atraumatic.  Eye: Sclera non-icteric.  Neck/Thyroid:  Full range of motion.  Respiratory: Non-labored respirations, Symmetrical chest wall  expansion.  Musculoskeletal: Normal range of motion.  Integumentary: Warm, Dry, Intact, No visible suspicious lesions or rashes. No diaphoresis.   Neurologic: No focal deficits  Psychiatric: Normal interaction, Coherent speech, Euthymic mood, Appropriate affect       Assessment:       ICD-10-CM ICD-9-CM   1. Anxiety  F41.9 300.00        Plan:     Problem List Items Addressed This Visit          Psychiatric    Anxiety    Relevant Medications    EScitalopram oxalate (LEXAPRO) 10 MG tablet    1. Anxiety  - EScitalopram oxalate (LEXAPRO) 10 MG tablet; Take 1 tablet (10 mg total) by mouth once daily.  Dispense: 30 tablet; Refill: 11  Stable   Continue current medications.  Denies depressive episodes.  Recommend relaxation techniques and coping strategies (i.e. essential oils, deep breathing, exercise, etc).  Seek immediate medical treatment for SOB, persistent panic attack, chest pain, suicidal thoughts or hallucinations.    Follow up in 6 months.    Video Time Documentation:  Spent 15 minutes with patient face to face via telemedicine discussing health concerns and completing EHR. More than 50% of this time was spent in counseling and coordination of care.

## 2024-11-21 ENCOUNTER — OFFICE VISIT (OUTPATIENT)
Dept: URGENT CARE | Facility: CLINIC | Age: 39
End: 2024-11-21
Payer: COMMERCIAL

## 2024-11-21 VITALS
HEIGHT: 66 IN | HEART RATE: 107 BPM | RESPIRATION RATE: 17 BRPM | OXYGEN SATURATION: 99 % | SYSTOLIC BLOOD PRESSURE: 122 MMHG | DIASTOLIC BLOOD PRESSURE: 80 MMHG | WEIGHT: 160 LBS | BODY MASS INDEX: 25.71 KG/M2 | TEMPERATURE: 98 F

## 2024-11-21 DIAGNOSIS — J02.9 SORE THROAT: Primary | ICD-10-CM

## 2024-11-21 DIAGNOSIS — R52 BODY ACHES: ICD-10-CM

## 2024-11-21 DIAGNOSIS — R53.83 FATIGUE, UNSPECIFIED TYPE: ICD-10-CM

## 2024-11-21 DIAGNOSIS — R50.9 FEVER, UNSPECIFIED FEVER CAUSE: ICD-10-CM

## 2024-11-21 LAB
CTP QC/QA: YES
CTP QC/QA: YES
MOLECULAR STREP A: NEGATIVE
SARS-COV-2 AG RESP QL IA.RAPID: NEGATIVE

## 2024-11-21 RX ORDER — PROMETHAZINE HYDROCHLORIDE AND DEXTROMETHORPHAN HYDROBROMIDE 6.25; 15 MG/5ML; MG/5ML
5 SYRUP ORAL EVERY 8 HOURS PRN
Qty: 118 ML | Refills: 0 | Status: SHIPPED | OUTPATIENT
Start: 2024-11-21 | End: 2024-11-26

## 2024-11-21 RX ORDER — CLINDAMYCIN HYDROCHLORIDE 300 MG/1
300 CAPSULE ORAL EVERY 8 HOURS
Qty: 30 CAPSULE | Refills: 0 | Status: SHIPPED | OUTPATIENT
Start: 2024-11-21 | End: 2024-12-01

## 2024-11-21 RX ORDER — BETAMETHASONE SODIUM PHOSPHATE AND BETAMETHASONE ACETATE 3; 3 MG/ML; MG/ML
9 INJECTION, SUSPENSION INTRA-ARTICULAR; INTRALESIONAL; INTRAMUSCULAR; SOFT TISSUE
Status: COMPLETED | OUTPATIENT
Start: 2024-11-21 | End: 2024-11-21

## 2024-11-21 RX ADMIN — BETAMETHASONE SODIUM PHOSPHATE AND BETAMETHASONE ACETATE 9 MG: 3; 3 INJECTION, SUSPENSION INTRA-ARTICULAR; INTRALESIONAL; INTRAMUSCULAR; SOFT TISSUE at 09:11

## 2024-11-21 NOTE — LETTER
November 21, 2024      [facility]  [address] [address]  Phone: [phone]       Patient: Nevin Liz   YOB: 1985  Date of Visit: 11/21/2024    To Whom It May Concern:    Jayda Liz  was at [facility] on 11/21/2024. She may return to work/school on 11/25/2024 with no restrictions. If you have any questions or concerns, or if I can be of further assistance, please do not hesitate to contact me.    Sincerely,    Erlinda Serrano LPN

## 2024-11-21 NOTE — PATIENT INSTRUCTIONS
High concern for fever, sore throat, body aches, malaise possible to early testing today.  Negative COVID, negative strep, invalid influenza test.    Recommend saltwater gargles throat lozenge or Chloraseptic spray hourly if needed for temporary sore throat relief.  Recommend daily non sedating antihistamine Claritin Zyrtec or loratadine over the next 1-2 weeks for nasal allergies with Benadryl nightly if needed for severe nasal allergies.  Recommend Sudafed or Coricidin decongestant over the next week if needed for nasal congestion and 3 day limited Afrin or Aaron-Synephrine.  Phenergan DM sparingly lowest dose if needed for severe cough cold congestion body aches and rest.  If new symptoms develop may return in 24-48 hours if repeat testing needed.  If not improving in the next 3-5 days or fever persist and throat pain worsens recommend clindamycin antibiotic backup coverage.    Recommend follow-up with primary care physician in 1-2 weeks for re-evaluation if not improving.  Recommended emergency department evaluation immediately if respiratory symptoms worsen.

## 2024-11-21 NOTE — PROGRESS NOTES
"Subjective:      Patient ID: Nevin Liz is a 39 y.o. female.    Vitals:  height is 5' 6" (1.676 m) and weight is 72.6 kg (160 lb). Her temperature is 98.2 °F (36.8 °C). Her blood pressure is 122/80 and her pulse is 107. Her respiration is 17 and oxygen saturation is 99%.     Chief Complaint: Sore Throat    Female reports in the last 24 hours having fever 102° F, generalized fatigue body aches sore throat headache taking NyQuil sleeping well overnight presents to urgent Care for initial evaluation.  Patient reports daughter sick contact in household in the last 3 days now resolved return back to school.    Sore Throat   This is a new problem. The current episode started yesterday. Associated symptoms include headaches. Pertinent negatives include no congestion, coughing, ear pain, neck pain, shortness of breath, stridor or trouble swallowing.     Constitution: Positive for chills, fatigue and fever.   HENT:  Positive for sore throat. Negative for ear pain, congestion, sinus pain, sinus pressure, trouble swallowing and voice change.    Neck: Negative for neck pain and neck swelling.   Cardiovascular: Negative.    Respiratory:  Negative for cough, shortness of breath, stridor and wheezing.    Gastrointestinal: Negative.    Musculoskeletal:  Positive for muscle ache.   Skin: Negative.    Allergic/Immunologic: Negative.    Neurological:  Positive for headaches.   Psychiatric/Behavioral: Negative.        Objective:     Physical Exam   Constitutional: She is oriented to person, place, and time. She appears well-developed. She is cooperative.  Non-toxic appearance.      Comments:Awake alert pleasant ambulatory nasally congested female speaks in complete sentences no active cough     HENT:   Head: Normocephalic.   Ears:   Right Ear: Hearing, tympanic membrane, external ear and ear canal normal. Tympanic membrane is not erythematous and not bulging. No middle ear effusion.   Left Ear: Hearing, tympanic membrane, external " ear and ear canal normal. Tympanic membrane is not erythematous and not bulging.  No middle ear effusion.   Nose: Mucosal edema and congestion present. No rhinorrhea, purulent discharge or nasal deformity. No epistaxis. Right sinus exhibits no maxillary sinus tenderness and no frontal sinus tenderness. Left sinus exhibits no maxillary sinus tenderness and no frontal sinus tenderness.   Mouth/Throat: Uvula is midline and mucous membranes are normal. Mucous membranes are moist. No trismus in the jaw. Normal dentition. No uvula swelling. No oropharyngeal exudate, posterior oropharyngeal edema or posterior oropharyngeal erythema.      Comments: Midline faint palate petechiae no uvula edema no uvula shift  Eyes: Conjunctivae and lids are normal. No scleral icterus.   Neck: Trachea normal and phonation normal. Neck supple. No edema present. No erythema present. No neck rigidity present.   Cardiovascular: Normal rate, regular rhythm, normal heart sounds and normal pulses.   Pulmonary/Chest: Effort normal and breath sounds normal. No stridor. No respiratory distress. She has no decreased breath sounds. She has no wheezes. She has no rhonchi. She has no rales.   Musculoskeletal: Normal range of motion.         General: Normal range of motion.      Cervical back: She exhibits no tenderness.   Lymphadenopathy:     She has no cervical adenopathy.   Neurological: no focal deficit. She is alert and oriented to person, place, and time. She displays no weakness. She exhibits normal muscle tone. Coordination normal.   Skin: Skin is warm, dry, intact, not diaphoretic and not pale.   Psychiatric: Her speech is normal and behavior is normal. Judgment and thought content normal.   Nursing note and vitals reviewed.       Previous History      Review of patient's allergies indicates:   Allergen Reactions    Adhesive     Gluten Hives    Penicillins      Rash on chest        Past Medical History:   Diagnosis Date    Anxiety     Uterine  "fibroid      Current Outpatient Medications   Medication Instructions    clindamycin (CLEOCIN) 300 mg, Oral, Every 8 hours    EScitalopram oxalate (LEXAPRO) 10 mg, Oral, Daily    promethazine-dextromethorphan (PROMETHAZINE-DM) 6.25-15 mg/5 mL Syrp 5 mLs, Oral, Every 8 hours PRN     Past Surgical History:   Procedure Laterality Date     SECTION       SECTION N/A 2024    Procedure:  SECTION;  Surgeon: Kathleen Edwards MD;  Location: [facility]  Service: OB/GYN;  Laterality: N/A;    CYSTOSCOPY      HERNIA REPAIR      LOOP ELECTROSURGICAL EXCISION PROCEDURE (LEEP)      WISDOM TOOTH EXTRACTION       No family history on file.    Social History     Tobacco Use    Smoking status: Never     Passive exposure: Never    Smokeless tobacco: Never   Substance Use Topics    Alcohol use: Not Currently    Drug use: Never        Physical Exam      Vital Signs Reviewed   /80   Pulse 107   Temp 98.2 °F (36.8 °C)   Resp 17   Ht 5' 6" (1.676 m)   Wt 72.6 kg (160 lb)   SpO2 99%   Breastfeeding No   BMI 25.82 kg/m²        Procedures    Procedures     Labs     Results for orders placed or performed in visit on 24   POCT Strep A, Molecular    Collection Time: 24  8:50 AM   Result Value Ref Range    Molecular Strep A, POC Negative Negative     Acceptable Yes    SARS Coronavirus 2 Antigen, POCT Manual Read    Collection Time: 24  9:22 AM   Result Value Ref Range    SARS Coronavirus 2 Antigen Negative Negative     Acceptable Yes          Assessment:     1. Sore throat    2. Fever, unspecified fever cause    3. Fatigue, unspecified type    4. Body aches        Plan:   Patient A&O x4 understands negative strep testing today.  Patient offered and accepts viral testing.  COVID test negative though influenza test twice invalid.  Patient offered and accepts steroid injection while in clinic today understands concern for possible viral illness and discharge " plan with symptomatic Rx and backup paper prescription clindamycin if return if repeat testing needed.    High concern for fever, sore throat, body aches, malaise possible to early testing today.  Negative COVID, negative strep, invalid influenza test.    Recommend saltwater gargles throat lozenge or Chloraseptic spray hourly if needed for temporary sore throat relief.  Recommend daily non sedating antihistamine Claritin Zyrtec or loratadine over the next 1-2 weeks for nasal allergies with Benadryl nightly if needed for severe nasal allergies.  Recommend Sudafed or Coricidin decongestant over the next week if needed for nasal congestion and 3 day limited Afrin or Aaron-Synephrine.  Phenergan DM sparingly lowest dose if needed for severe cough cold congestion body aches and rest.  If new symptoms develop may return in 24-48 hours if repeat testing needed.  If not improving in the next 3-5 days or fever persist and throat pain worsens recommend clindamycin antibiotic backup coverage.    Recommend follow-up with primary care physician in 1-2 weeks for re-evaluation if not improving.  Recommended emergency department evaluation immediately if respiratory symptoms worsen.  Sore throat  -     POCT Strep A, Molecular  -     Cancel: POCT Influenza A/B MOLECULAR  -     SARS Coronavirus 2 Antigen, POCT Manual Read    Fever, unspecified fever cause    Fatigue, unspecified type    Body aches    Other orders  -     betamethasone acetate-betamethasone sodium phosphate injection 9 mg  -     promethazine-dextromethorphan (PROMETHAZINE-DM) 6.25-15 mg/5 mL Syrp; Take 5 mLs by mouth every 8 (eight) hours as needed (cough).  Dispense: 118 mL; Refill: 0  -     clindamycin (CLEOCIN) 300 MG capsule; Take 1 capsule (300 mg total) by mouth every 8 (eight) hours. for 10 days  Dispense: 30 capsule; Refill: 0

## (undated) DEVICE — SUT CTD VICRYL 0 UND BR CT

## (undated) DEVICE — BULB SYRINGE EAR IRRIGATION

## (undated) DEVICE — SOL WATER STRL IRR 1000ML

## (undated) DEVICE — SEE MEDLINE ITEM 157117

## (undated) DEVICE — PAD SANITARY OB STERILE

## (undated) DEVICE — SUT CHROMIC GUT 2-0 CT-1 27IN

## (undated) DEVICE — Device

## (undated) DEVICE — BINDER ABDOM 4PANEL 12IN LG/XL

## (undated) DEVICE — SEE MEDLINE ITEM 156931

## (undated) DEVICE — SUT 0 36IN PDS II VIO MONO

## (undated) DEVICE — ELECTRODE REM PLYHSV RETURN 9

## (undated) DEVICE — SOL NACL IRR 1000ML BTL

## (undated) DEVICE — SUT 3/0 36IN COATED VICRYL

## (undated) DEVICE — CAP BABY BEANIE

## (undated) DEVICE — SYS CLSR DERMABOND PRINEO 22CM

## (undated) DEVICE — PAD UNDERPAD 30X30

## (undated) DEVICE — SUT 2-0 VICRYL / CT-1